# Patient Record
Sex: MALE | Race: WHITE | Employment: FULL TIME | URBAN - METROPOLITAN AREA
[De-identification: names, ages, dates, MRNs, and addresses within clinical notes are randomized per-mention and may not be internally consistent; named-entity substitution may affect disease eponyms.]

---

## 2017-03-26 DIAGNOSIS — F41.9 ANXIETY: ICD-10-CM

## 2017-03-27 RX ORDER — ALPRAZOLAM 0.5 MG/1
0.5 TABLET ORAL
Qty: 30 TAB | Refills: 1 | Status: SHIPPED | OUTPATIENT
Start: 2017-03-27 | End: 2017-11-12 | Stop reason: SDUPTHER

## 2017-03-27 NOTE — TELEPHONE ENCOUNTER
Requested Prescriptions     Pending Prescriptions Disp Refills    ALPRAZolam (XANAX) 0.5 mg tablet 30 Tab 1     Sig: Take 1 Tab by mouth daily as needed for Anxiety.      LOV 9/7/16  NOV NONE   Last refill 8/30/16

## 2017-03-27 NOTE — TELEPHONE ENCOUNTER
From: Denise Barnett  To: Lona Payne MD  Sent: 3/26/2017 9:14 PM EDT  Subject: Medication Renewal Request    Original authorizing provider: MD Denise Root would like a refill of the following medications:  ALPRAZolam Alverta Mckenna) 0.5 mg tablet Lona Payne MD]    Preferred pharmacy: [address] [place] [address]:

## 2017-11-12 DIAGNOSIS — F41.9 ANXIETY: ICD-10-CM

## 2017-11-13 RX ORDER — ALPRAZOLAM 0.5 MG/1
0.5 TABLET ORAL
Qty: 30 TAB | Refills: 0 | OUTPATIENT
Start: 2017-11-13 | End: 2018-02-01 | Stop reason: SDUPTHER

## 2017-11-13 NOTE — TELEPHONE ENCOUNTER
From: [facility]  To: Fanny Nance MD  Sent: 11/12/2017 7:40 AM EST  Subject: Medication Renewal Request    Original authorizing provider: MD Claudio Chaney would like a refill of the following medications:  ALPRAZolam Catrachita Sanes) 0.5 mg tablet Fanny Nance MD]    Preferred pharmacy: [address] [place] [address] Ct:

## 2017-11-13 NOTE — TELEPHONE ENCOUNTER
Last office visit- 3/26/17   Next office visit- No upcoming   Last Refill- 3/27/17    Requested Prescriptions     Pending Prescriptions Disp Refills    ALPRAZolam (XANAX) 0.5 mg tablet 30 Tab 1     Sig: Take 1 Tab by mouth daily as needed for Anxiety.

## 2018-02-01 DIAGNOSIS — F41.9 ANXIETY: ICD-10-CM

## 2018-02-01 RX ORDER — ALPRAZOLAM 0.5 MG/1
0.5 TABLET ORAL
Qty: 30 TAB | Refills: 0 | Status: SHIPPED | OUTPATIENT
Start: 2018-02-01 | End: 2018-04-24 | Stop reason: SDUPTHER

## 2018-02-01 NOTE — TELEPHONE ENCOUNTER
checked as delegate on 2/1/18  Last fill on ALPRAZOLAM 0.5 MG TABLET was 11/15/2017  # 30 for a 30 day supply

## 2018-02-01 NOTE — TELEPHONE ENCOUNTER
From: Cherri Rangel  To: Molly Correia MD  Sent: 2/1/2018 8:09 AM EST  Subject: Medication Renewal Request    Original authorizing provider: MD Cortez Mcneal would like a refill of the following medications:  ALPRAZolam (XANAX) 0.5 mg tablet Molly Correia MD]    Preferred pharmacy: [facility]/PHARMACY #[id number] [facility], [address] [facility] Ct:  I would like to request a refill

## 2018-04-24 DIAGNOSIS — F41.9 ANXIETY: ICD-10-CM

## 2018-04-25 DIAGNOSIS — Z00.00 ROUTINE GENERAL MEDICAL EXAMINATION AT A HEALTH CARE FACILITY: Primary | ICD-10-CM

## 2018-04-25 RX ORDER — ALPRAZOLAM 0.5 MG/1
0.5 TABLET ORAL
Qty: 30 TAB | Refills: 0 | Status: SHIPPED | OUTPATIENT
Start: 2018-04-25 | End: 2018-08-16 | Stop reason: SDUPTHER

## 2018-05-31 ENCOUNTER — OFFICE VISIT (OUTPATIENT)
Dept: INTERNAL MEDICINE CLINIC | Age: 53
End: 2018-05-31

## 2018-05-31 VITALS
SYSTOLIC BLOOD PRESSURE: 136 MMHG | HEART RATE: 68 BPM | HEIGHT: 72 IN | BODY MASS INDEX: 29.39 KG/M2 | RESPIRATION RATE: 16 BRPM | TEMPERATURE: 98.6 F | DIASTOLIC BLOOD PRESSURE: 90 MMHG | WEIGHT: 217 LBS | OXYGEN SATURATION: 98 %

## 2018-05-31 DIAGNOSIS — Z00.00 ROUTINE GENERAL MEDICAL EXAMINATION AT A HEALTH CARE FACILITY: Primary | ICD-10-CM

## 2018-05-31 DIAGNOSIS — R03.0 PREHYPERTENSION: ICD-10-CM

## 2018-05-31 DIAGNOSIS — E55.9 VITAMIN D DEFICIENCY: ICD-10-CM

## 2018-05-31 NOTE — PATIENT INSTRUCTIONS
Omron BP machine-     . DASH Diet: Care Instructions  Your Care Instructions    The DASH diet is an eating plan that can help lower your blood pressure. DASH stands for Dietary Approaches to Stop Hypertension. Hypertension is high blood pressure. The DASH diet focuses on eating foods that are high in calcium, potassium, and magnesium. These nutrients can lower blood pressure. The foods that are highest in these nutrients are fruits, vegetables, low-fat dairy products, nuts, seeds, and legumes. But taking calcium, potassium, and magnesium supplements instead of eating foods that are high in those nutrients does not have the same effect. The DASH diet also includes whole grains, fish, and poultry. The DASH diet is one of several lifestyle changes your doctor may recommend to lower your high blood pressure. Your doctor may also want you to decrease the amount of sodium in your diet. Lowering sodium while following the DASH diet can lower blood pressure even further than just the DASH diet alone. Follow-up care is a key part of your treatment and safety. Be sure to make and go to all appointments, and call your doctor if you are having problems. It's also a good idea to know your test results and keep a list of the medicines you take. How can you care for yourself at home? Following the DASH diet  · Eat 4 to 5 servings of fruit each day. A serving is 1 medium-sized piece of fruit, ½ cup chopped or canned fruit, 1/4 cup dried fruit, or 4 ounces (½ cup) of fruit juice. Choose fruit more often than fruit juice. · Eat 4 to 5 servings of vegetables each day. A serving is 1 cup of lettuce or raw leafy vegetables, ½ cup of chopped or cooked vegetables, or 4 ounces (½ cup) of vegetable juice. Choose vegetables more often than vegetable juice. · Get 2 to 3 servings of low-fat and fat-free dairy each day. A serving is 8 ounces of milk, 1 cup of yogurt, or 1 ½ ounces of cheese. · Eat 6 to 8 servings of grains each day. A serving is 1 slice of bread, 1 ounce of dry cereal, or ½ cup of cooked rice, pasta, or cooked cereal. Try to choose whole-grain products as much as possible. · Limit lean meat, poultry, and fish to 2 servings each day. A serving is 3 ounces, about the size of a deck of cards. · Eat 4 to 5 servings of nuts, seeds, and legumes (cooked dried beans, lentils, and split peas) each week. A serving is 1/3 cup of nuts, 2 tablespoons of seeds, or ½ cup of cooked beans or peas. · Limit fats and oils to 2 to 3 servings each day. A serving is 1 teaspoon of vegetable oil or 2 tablespoons of salad dressing. · Limit sweets and added sugars to 5 servings or less a week. A serving is 1 tablespoon jelly or jam, ½ cup sorbet, or 1 cup of lemonade. · Eat less than 2,300 milligrams (mg) of sodium a day. If you limit your sodium to 1,500 mg a day, you can lower your blood pressure even more. Tips for success  · Start small. Do not try to make dramatic changes to your diet all at once. You might feel that you are missing out on your favorite foods and then be more likely to not follow the plan. Make small changes, and stick with them. Once those changes become habit, add a few more changes. · Try some of the following:  ¨ Make it a goal to eat a fruit or vegetable at every meal and at snacks. This will make it easy to get the recommended amount of fruits and vegetables each day. ¨ Try yogurt topped with fruit and nuts for a snack or healthy dessert. ¨ Add lettuce, tomato, cucumber, and onion to sandwiches. ¨ Combine a ready-made pizza crust with low-fat mozzarella cheese and lots of vegetable toppings. Try using tomatoes, squash, spinach, broccoli, carrots, cauliflower, and onions. ¨ Have a variety of cut-up vegetables with a low-fat dip as an appetizer instead of chips and dip. ¨ Sprinkle sunflower seeds or chopped almonds over salads. Or try adding chopped walnuts or almonds to cooked vegetables.   ¨ Try some vegetarian meals using beans and peas. Add garbanzo or kidney beans to salads. Make burritos and tacos with mashed kaur beans or black beans. Where can you learn more? Go to http://socrates-alvin.info/. Enter V926 in the search box to learn more about \"DASH Diet: Care Instructions. \"  Current as of: September 21, 2016  Content Version: 11.4  © 0674-0973 [employer]. Care instructions adapted under license by [employer] (which disclaims liability or warranty for this information). If you have questions about a medical condition or this instruction, always ask your healthcare professional. [place] [address] any warranty or liability for your use of this information.

## 2018-05-31 NOTE — PROGRESS NOTES
HPI:  Jv Winslow is a 47y.o. year old male who is here for an annual physical:  LAST SEEN: Visit date not found    Wt Readings from Last 3 Encounters:   05/31/18 217 lb (98.4 kg)   09/07/16 213 lb (96.6 kg)   03/23/15 211 lb (95.7 kg)     Temp Readings from Last 3 Encounters:   05/31/18 98.6 °F (37 °C) (Oral)   09/07/16 98.1 °F (36.7 °C) (Oral)   03/23/15 98 °F (36.7 °C) (Oral)     BP Readings from Last 3 Encounters:   05/31/18 136/90   09/07/16 118/80   03/23/15 118/78     Pulse Readings from Last 3 Encounters:   05/31/18 68   09/07/16 62   03/23/15 61        He reports the following history and medical concerns:      Finished radiation for prostate cancer    PSA going down at 2.1      Uses xanax only 2-3 times a week. Found two ticks < 24         Assessment and Plan        1. Routine general medical examination at a health care facility  Not seen for 1.5 years. Doing well. Eating not as healthy. Chol is very good. Discussed that numbers on labs don't always reflect good health. Stress reduction techniques besides xanax    2. Prehypertension  Borderline diastolic. Get home machine and it should be less than 130/80. Send me readings on new machine. 3. Vitamin D deficiency  Start 1000 International units once a day. May help with prostate cancer. Sees dermatologist every 6 months. Xanax to use sparingly for panic attacks only.                  Historical Data    Past Medical History:   Diagnosis Date    Anxiety     takes xanax prn prescribed by Dr. Shelly Valdez [facility])     Prostate    Prehypertension 5/31/2018    Skin cancer     Squamous cell Carcinoma of upper lip s/p Moh's    Sun-damaged skin        Past Surgical History:   Procedure Laterality Date    HX APPENDECTOMY      HX CRYOABLATION OF THE PROSTATE      HX OTHER SURGICAL      Meckel's  diverticulum    HX VASECTOMY         Outpatient Encounter Prescriptions as of 5/31/2018   Medication Sig Dispense Refill    ALPRAZolam Zoë French) 0.5 mg tablet Take 1 Tab by mouth daily as needed for Anxiety. 30 Tab 0    aspirin delayed-release 81 mg tablet Take 1 Tab by mouth daily. 30 Tab 3     No facility-administered encounter medications on file as of 5/31/2018. No Known Allergies     Social History     Social History    Marital status:      Spouse name: N/A    Number of children: N/A    Years of education: N/A     Occupational History    [place]      Social History Main Topics    Smoking status: Current Some Day Smoker     Types: Cigars    Smokeless tobacco: Current User     Types: Snuff      Comment: once a month    Alcohol use 2.5 - 3.0 oz/week     5 - 6 Standard drinks or equivalent per week      Comment: 5-6 a week    Drug use: No    Sexual activity: Yes     Partners: Female     Other Topics Concern    Not on file     Social History Narrative        family history includes Cancer in his mother; Heart Disease in his father. Review of Systems   Constitutional: Negative for chills, diaphoresis, fever, malaise/fatigue and weight loss. HENT: Negative for hearing loss. Respiratory: Negative for cough. Cardiovascular: Negative for chest pain. Gastrointestinal: Negative for blood in stool and constipation. Genitourinary: Negative for dysuria, flank pain, frequency and urgency. Musculoskeletal: Negative for myalgias. Skin: Negative for rash. Neurological: Negative for dizziness, weakness and headaches. Endo/Heme/Allergies: Does not bruise/bleed easily. Visit Vitals    /90 (BP 1 Location: Left arm, BP Patient Position: Sitting)    Pulse 68    Temp 98.6 °F (37 °C) (Oral)    Resp 16    Ht 6' (1.829 m)    Wt 217 lb (98.4 kg)    SpO2 98%    BMI 29.43 kg/m2         Physical Exam   Constitutional: He is oriented to person, place, and time and well-developed, well-nourished, and in no distress. No distress.    HENT:   Nose: Nose normal.   Mouth/Throat: Oropharynx is clear and moist. No oropharyngeal exudate. Eyes: Conjunctivae and EOM are normal. Left eye exhibits no discharge. Neck: Normal range of motion. Neck supple. No thyromegaly present. Cardiovascular: Normal rate, regular rhythm and normal heart sounds. Exam reveals no friction rub. No murmur heard. Pulmonary/Chest: Effort normal and breath sounds normal. No respiratory distress. He has no wheezes. He has no rales. Abdominal: Soft. Bowel sounds are normal. He exhibits no distension. There is no tenderness. Musculoskeletal: Normal range of motion. He exhibits no edema, tenderness or deformity. Lymphadenopathy:     He has no cervical adenopathy. Neurological: He is alert and oriented to person, place, and time. He exhibits normal muscle tone. Coordination normal.   Skin: Skin is warm and dry. No rash noted. No erythema. Psychiatric: Mood and affect normal.     Ortho Exam     Assessment:  See Above for discussion/plan. Annual Physical completed. I have reviewed the patient's medical history in detail and updated the computerized patient record. We had a prolonged discussion about these complex clinical issues and went over the various important aspects to consider. All questions were answered. Advised him to call back or return to office if symptoms do not improve, change in nature, or persist.    He was given an after visit summary or informed of SendtoNews Access which includes patient instructions, diagnoses, current medications, & vitals. He expressed understanding with the diagnosis and plan.

## 2018-05-31 NOTE — MR AVS SNAPSHOT
[address] [facility], [address] [place] [address] 
[phone] Patient: Mikael Muir MRN:  [id number] Visit Information Date & Time Provider Department Dept. Phone Encounter #  
 2018  1:45 PM Guille Lynne MD [place] [id number] [id number] Follow-up Instructions Return in about 6 months (around 2018) for Follow up 15 minutes. Upcoming Health Maintenance Date Due Pneumococcal 19-64 Highest Risk (1 of 3 - PCV13) 3/25/1985 DTaP/Tdap/Td series (1 - Tdap) 3/22/2014 Influenza Age 5 to Adult 2018 COLONOSCOPY 3/10/2027 Allergies as of 2018  Review Complete On: 2018 By: Guille Lynne MD  
 No Known Allergies Current Immunizations  Reviewed on 3/23/2015 Name Date Td, Adsorbed PF 3/21/2014 10:43 AM  
  
 Not reviewed this visit Vitals BP Pulse Temp Resp Height(growth percentile) Weight(growth percentile) 136/90 (BP 1 Location: Left arm, BP Patient Position: Sitting) 68 98.6 °F (37 °C) (Oral) 16 6' (1.829 m) 217 lb (98.4 kg) SpO2 BMI Smoking Status 98% 29.43 kg/m2 Current Some Day Smoker Vitals History BMI and BSA Data Body Mass Index Body Surface Area  
 29.43 kg/m 2 2.24 m 2 Preferred Pharmacy Pharmacy Name Phone [facility]/PHARMACY #[id number]- [facility], [address] [phone] Your Updated Medication List  
  
   
This list is accurate as of 18  2:33 PM.  Always use your most recent med list.  
  
  
  
  
 ALPRAZolam 0.5 mg tablet Commonly known as:  Catalina Hopes Take 1 Tab by mouth daily as needed for Anxiety. aspirin delayed-release 81 mg tablet Take 1 Tab by mouth daily. Follow-up Instructions Return in about 6 months (around 2018) for Follow up 15 minutes. Patient Instructions Omron BP machine-  
 
 . 
  
DASH Diet: Care Instructions Your Care Instructions The DASH diet is an eating plan that can help lower your blood pressure. DASH stands for Dietary Approaches to Stop Hypertension. Hypertension is high blood pressure. The DASH diet focuses on eating foods that are high in calcium, potassium, and magnesium. These nutrients can lower blood pressure. The foods that are highest in these nutrients are fruits, vegetables, low-fat dairy products, nuts, seeds, and legumes. But taking calcium, potassium, and magnesium supplements instead of eating foods that are high in those nutrients does not have the same effect. The DASH diet also includes whole grains, fish, and poultry. The DASH diet is one of several lifestyle changes your doctor may recommend to lower your high blood pressure. Your doctor may also want you to decrease the amount of sodium in your diet. Lowering sodium while following the DASH diet can lower blood pressure even further than just the DASH diet alone. Follow-up care is a key part of your treatment and safety. Be sure to make and go to all appointments, and call your doctor if you are having problems. It's also a good idea to know your test results and keep a list of the medicines you take. How can you care for yourself at home? Following the DASH diet · Eat 4 to 5 servings of fruit each day. A serving is 1 medium-sized piece of fruit, ½ cup chopped or canned fruit, 1/4 cup dried fruit, or 4 ounces (½ cup) of fruit juice. Choose fruit more often than fruit juice. · Eat 4 to 5 servings of vegetables each day. A serving is 1 cup of lettuce or raw leafy vegetables, ½ cup of chopped or cooked vegetables, or 4 ounces (½ cup) of vegetable juice. Choose vegetables more often than vegetable juice. · Get 2 to 3 servings of low-fat and fat-free dairy each day. A serving is 8 ounces of milk, 1 cup of yogurt, or 1 ½ ounces of cheese. · Eat 6 to 8 servings of grains each day. A serving is 1 slice of bread, 1 ounce of dry cereal, or ½ cup of cooked rice, pasta, or cooked cereal. Try to choose whole-grain products as much as possible. · Limit lean meat, poultry, and fish to 2 servings each day. A serving is 3 ounces, about the size of a deck of cards. · Eat 4 to 5 servings of nuts, seeds, and legumes (cooked dried beans, lentils, and split peas) each week. A serving is 1/3 cup of nuts, 2 tablespoons of seeds, or ½ cup of cooked beans or peas. · Limit fats and oils to 2 to 3 servings each day. A serving is 1 teaspoon of vegetable oil or 2 tablespoons of salad dressing. · Limit sweets and added sugars to 5 servings or less a week. A serving is 1 tablespoon jelly or jam, ½ cup sorbet, or 1 cup of lemonade. · Eat less than 2,300 milligrams (mg) of sodium a day. If you limit your sodium to 1,500 mg a day, you can lower your blood pressure even more. Tips for success · Start small. Do not try to make dramatic changes to your diet all at once. You might feel that you are missing out on your favorite foods and then be more likely to not follow the plan. Make small changes, and stick with them. Once those changes become habit, add a few more changes. · Try some of the following: ¨ Make it a goal to eat a fruit or vegetable at every meal and at snacks. This will make it easy to get the recommended amount of fruits and vegetables each day. ¨ Try yogurt topped with fruit and nuts for a snack or healthy dessert. ¨ Add lettuce, tomato, cucumber, and onion to sandwiches. ¨ Combine a ready-made pizza crust with low-fat mozzarella cheese and lots of vegetable toppings. Try using tomatoes, squash, spinach, broccoli, carrots, cauliflower, and onions. ¨ Have a variety of cut-up vegetables with a low-fat dip as an appetizer instead of chips and dip. ¨ Sprinkle sunflower seeds or chopped almonds over salads.  Or try adding chopped walnuts or almonds to cooked vegetables. ¨ Try some vegetarian meals using beans and peas. Add garbanzo or kidney beans to salads. Make burritos and tacos with mashed kaur beans or black beans. Where can you learn more? Go to http://socrates-alvin.info/. Enter F927 in the search box to learn more about \"DASH Diet: Care Instructions. \" Current as of: September 21, 2016 Content Version: 11.4 © 8656-9965 appAttach. Care instructions adapted under license by [employer] (which disclaims liability or warranty for this information). If you have questions about a medical condition or this instruction, always ask your healthcare professional. [address] any warranty or liability for your use of this information. Introducing \[facility] [place] [facility]\"" & HEALTH SERVICES! Dear Maricruz Pepe: Thank you for requesting a Areshay account. Our records indicate that you already have an active Areshay account. You can access your account anytime at [email or web address] Did you know that you can access your hospital and ER discharge instructions at any time in Areshay? You can also review all of your test results from your hospital stay or ER visit. Additional Information If you have questions, please visit the Frequently Asked Questions section of the Areshay website at [email or web address] Remember, Areshay is NOT to be used for urgent needs. For medical emergencies, dial 911. Now available from your iPhone and Android! Please provide this summary of care documentation to your next provider. Your primary care clinician is listed as Tara Syed. If you have any questions after today's visit, please call [phone].

## 2018-08-16 DIAGNOSIS — F41.9 ANXIETY: ICD-10-CM

## 2018-08-17 NOTE — TELEPHONE ENCOUNTER
From: Alberto Link  To: Milton Treviño MD  Sent: 8/16/2018 11:35 AM EDT  Subject: Medication Renewal Request    Original authorizing provider: MD Anastacia Ames  Yanira would like a refill of the following medications:  ALPRAZolam Richard Re) 0.5 mg tablet Milton Treviño MD]    Preferred pharmacy: [address] [place] [address] Ct:

## 2018-08-18 DIAGNOSIS — F41.9 ANXIETY: ICD-10-CM

## 2018-08-20 RX ORDER — ALPRAZOLAM 0.5 MG/1
0.5 TABLET ORAL
Qty: 30 TAB | Refills: 0 | Status: SHIPPED | OUTPATIENT
Start: 2018-08-20 | End: 2018-08-23 | Stop reason: SDUPTHER

## 2018-08-23 RX ORDER — ALPRAZOLAM 0.5 MG/1
0.5 TABLET ORAL
Qty: 30 TAB | Refills: 0 | Status: SHIPPED | OUTPATIENT
Start: 2018-08-23 | End: 2018-10-29 | Stop reason: SDUPTHER

## 2018-08-23 NOTE — TELEPHONE ENCOUNTER
From: Haley Strong  To: Evangelina Kamara MD  Sent: 8/18/2018 8:52 AM EDT  Subject: Medication Renewal Request    Original authorizing provider: MD Madison Howell would like a refill of the following medications:  ALPRAZolam Ed Jetty) 0.5 mg tablet Evangelina Kamara MD]    Preferred pharmacy: [address] [place] [address] Ct:

## 2018-10-29 DIAGNOSIS — F41.9 ANXIETY: ICD-10-CM

## 2018-10-31 RX ORDER — ALPRAZOLAM 0.5 MG/1
0.5 TABLET ORAL
Qty: 30 TAB | Refills: 0 | Status: SHIPPED | OUTPATIENT
Start: 2018-10-31 | End: 2019-02-20 | Stop reason: SDUPTHER

## 2018-11-27 ENCOUNTER — OFFICE VISIT (OUTPATIENT)
Dept: INTERNAL MEDICINE CLINIC | Age: 53
End: 2018-11-27

## 2018-11-27 VITALS
HEIGHT: 72 IN | DIASTOLIC BLOOD PRESSURE: 84 MMHG | BODY MASS INDEX: 30.26 KG/M2 | HEART RATE: 72 BPM | WEIGHT: 223.4 LBS | TEMPERATURE: 98.2 F | RESPIRATION RATE: 18 BRPM | SYSTOLIC BLOOD PRESSURE: 132 MMHG | OXYGEN SATURATION: 97 %

## 2018-11-27 DIAGNOSIS — R03.0 PREHYPERTENSION: Primary | ICD-10-CM

## 2018-11-27 RX ORDER — SILDENAFIL CITRATE 20 MG/1
TABLET ORAL
Refills: 5 | COMMUNITY
Start: 2018-11-16

## 2018-11-27 NOTE — PROGRESS NOTES
Hypertension (6 mth f/u) HPI: 
Fercho Rowe is a 47y.o. year old male who is here for a follow up visit. He was last seen by me on 5/31/2018. He reports the following: 
 
Radiation seeds  PSA 0.9- doing well. Needs cialis. BP at home 137/81  132/90 No chest pain or SOB or HA Assessment and Plan 1. Borderline hypertension- 
Continue to observe. Home readings are ok but DBP still a little high. Work on stress reduction. Breathing exercises. DASH diet. Visit Vitals /84 Pulse 72 Temp 98.2 °F (36.8 °C) (Oral) Resp 18 Ht 6' (1.829 m) Wt 223 lb 6.4 oz (101.3 kg) SpO2 97% BMI 30.30 kg/m² Historical Data Past Medical History:  
Diagnosis Date  Anxiety   
 takes xanax prn prescribed by Dr. Emily Mills  Cancer ([facility] [address] Prostate  Prehypertension 5/31/2018  Skin cancer Squamous cell Carcinoma of upper lip s/p Moh's  Sun-damaged skin Past Surgical History:  
Procedure Laterality Date  HX APPENDECTOMY  HX CRYOABLATION OF THE PROSTATE    
 HX OTHER SURGICAL Meckel's  diverticulum  HX VASECTOMY Outpatient Encounter Medications as of 11/27/2018 Medication Sig Dispense Refill  sildenafil, antihypertensive, (REVATIO) 20 mg tablet TAKE ONE TO FIVE TABLETS BY MOUTH AS NEEDED ONE HOUR PRIOR TO SEXUAL ACTIVITY  5  
 ALPRAZolam (XANAX) 0.5 mg tablet Take 1 Tab by mouth daily as needed for Anxiety. 30 Tab 0  
 aspirin delayed-release 81 mg tablet Take 1 Tab by mouth daily. 30 Tab 3 No facility-administered encounter medications on file as of 11/27/2018. No Known Allergies Social History Socioeconomic History  Marital status:  Spouse name: Not on file  Number of children: Not on file  Years of education: Not on file  Highest education level: Not on file Social Needs  Financial resource strain: Not on file  Food insecurity - worry: Not on file  Food insecurity - inability: Not on file  Transportation needs - medical: Not on file  Transportation needs - non-medical: Not on file Occupational History  Occupation: [employer] Tobacco Use  Smoking status: Current Some Day Smoker Types: Cigars  Smokeless tobacco: Current User Types: Snuff  Tobacco comment: once a month Substance and Sexual Activity  Alcohol use: Yes Alcohol/week: 2.5 - 3.0 oz Types: 5 - 6 Standard drinks or equivalent per week Comment: 5-6 a week  Drug use: No  
 Sexual activity: Yes  
  Partners: Female Other Topics Concern  Not on file Social History Narrative  Not on file  
  
 
family history includes Cancer in his mother; Heart Disease in his father. Review of Systems Constitutional: Negative for weight loss. Eyes: Negative for blurred vision. Respiratory: Negative for shortness of breath. Cardiovascular: Negative for chest pain. Gastrointestinal: Negative for abdominal pain. Genitourinary: Negative for dysuria and frequency. Skin: Negative for rash. Neurological: Negative for dizziness, focal weakness, weakness and headaches. Endo/Heme/Allergies: Negative for environmental allergies. Does not bruise/bleed easily. Physical Exam  
Constitutional: He appears well-developed and well-nourished. He is active. Non-toxic appearance. He does not have a sickly appearance. He does not appear ill. No distress. Eyes: Conjunctivae are normal. Right eye exhibits no discharge. Neck: Carotid bruit is not present. No thyroid mass and no thyromegaly present. Cardiovascular: Normal rate, regular rhythm, S1 normal, S2 normal, normal heart sounds and normal pulses. Exam reveals no gallop and no friction rub. Pulmonary/Chest: Effort normal and breath sounds normal. No respiratory distress. Abdominal: Soft. Bowel sounds are normal.  
Musculoskeletal: He exhibits no edema or deformity. Neurological: He is alert. Coordination normal.  
Skin: Skin is warm and dry. No rash noted. No pallor. Psychiatric: He has a normal mood and affect. His behavior is normal.  
Vitals reviewed. Ortho Exam 
 
 
Orders Placed This Encounter  sildenafil, antihypertensive, (REVATIO) 20 mg tablet Sig: TAKE ONE TO FIVE TABLETS BY MOUTH AS NEEDED ONE HOUR PRIOR TO SEXUAL ACTIVITY Refill:  5 I have reviewed the patient's medical history in detail and updated the computerized patient record. We had a prolonged discussion about these complex clinical issues and went over the various important aspects to consider. All questions were answered. Advised him to call back or return to office if symptoms do not improve, change in nature, or persist. 
 
He was given an after visit summary or informed of [employer] which includes patient instructions, diagnoses, current medications, & vitals. He expressed understanding with the diagnosis and plan.

## 2018-11-27 NOTE — PATIENT INSTRUCTIONS
DASH Diet: Care Instructions Your Care Instructions The DASH diet is an eating plan that can help lower your blood pressure. DASH stands for Dietary Approaches to Stop Hypertension. Hypertension is high blood pressure. The DASH diet focuses on eating foods that are high in calcium, potassium, and magnesium. These nutrients can lower blood pressure. The foods that are highest in these nutrients are fruits, vegetables, low-fat dairy products, nuts, seeds, and legumes. But taking calcium, potassium, and magnesium supplements instead of eating foods that are high in those nutrients does not have the same effect. The DASH diet also includes whole grains, fish, and poultry. The DASH diet is one of several lifestyle changes your doctor may recommend to lower your high blood pressure. Your doctor may also want you to decrease the amount of sodium in your diet. Lowering sodium while following the DASH diet can lower blood pressure even further than just the DASH diet alone. Follow-up care is a key part of your treatment and safety. Be sure to make and go to all appointments, and call your doctor if you are having problems. It's also a good idea to know your test results and keep a list of the medicines you take. How can you care for yourself at home? Following the DASH diet · Eat 4 to 5 servings of fruit each day. A serving is 1 medium-sized piece of fruit, ½ cup chopped or canned fruit, 1/4 cup dried fruit, or 4 ounces (½ cup) of fruit juice. Choose fruit more often than fruit juice. · Eat 4 to 5 servings of vegetables each day. A serving is 1 cup of lettuce or raw leafy vegetables, ½ cup of chopped or cooked vegetables, or 4 ounces (½ cup) of vegetable juice. Choose vegetables more often than vegetable juice. · Get 2 to 3 servings of low-fat and fat-free dairy each day. A serving is 8 ounces of milk, 1 cup of yogurt, or 1 ½ ounces of cheese. · Eat 6 to 8 servings of grains each day. A serving is 1 slice of bread, 1 ounce of dry cereal, or ½ cup of cooked rice, pasta, or cooked cereal. Try to choose whole-grain products as much as possible. · Limit lean meat, poultry, and fish to 2 servings each day. A serving is 3 ounces, about the size of a deck of cards. · Eat 4 to 5 servings of nuts, seeds, and legumes (cooked dried beans, lentils, and split peas) each week. A serving is 1/3 cup of nuts, 2 tablespoons of seeds, or ½ cup of cooked beans or peas. · Limit fats and oils to 2 to 3 servings each day. A serving is 1 teaspoon of vegetable oil or 2 tablespoons of salad dressing. · Limit sweets and added sugars to 5 servings or less a week. A serving is 1 tablespoon jelly or jam, ½ cup sorbet, or 1 cup of lemonade. · Eat less than 2,300 milligrams (mg) of sodium a day. If you limit your sodium to 1,500 mg a day, you can lower your blood pressure even more. Tips for success · Start small. Do not try to make dramatic changes to your diet all at once. You might feel that you are missing out on your favorite foods and then be more likely to not follow the plan. Make small changes, and stick with them. Once those changes become habit, add a few more changes. · Try some of the following: ? Make it a goal to eat a fruit or vegetable at every meal and at snacks. This will make it easy to get the recommended amount of fruits and vegetables each day. ? Try yogurt topped with fruit and nuts for a snack or healthy dessert. ? Add lettuce, tomato, cucumber, and onion to sandwiches. ? Combine a ready-made pizza crust with low-fat mozzarella cheese and lots of vegetable toppings. Try using tomatoes, squash, spinach, broccoli, carrots, cauliflower, and onions. ? Have a variety of cut-up vegetables with a low-fat dip as an appetizer instead of chips and dip. ? Sprinkle sunflower seeds or chopped almonds over salads.  Or try adding chopped walnuts or almonds to cooked vegetables. ? Try some vegetarian meals using beans and peas. Add garbanzo or kidney beans to salads. Make burritos and tacos with mashed kaur beans or black beans. Where can you learn more? Go to http://socrates-alvin.info/. Enter F934 in the search box to learn more about \"DASH Diet: Care Instructions. \" Current as of: December 6, 2017 Content Version: 11.8 © 4631-0309 [employer]. Care instructions adapted under license by [employer] (which disclaims liability or warranty for this information). If you have questions about a medical condition or this instruction, always ask your healthcare professional. [address] any warranty or liability for your use of this information. Exercise 1: 
The 4-7-8 (or Relaxing Breath) Exercise This exercise is utterly simple, takes almost no time, requires no equipment and can be done anywhere. Although you can do the exercise in any position, sit with your back straight while learning the exercise. Place the tip of your tongue against the ridge of tissue just behind your upper front teeth, and keep it there through the entire exercise. You will be exhaling through your mouth around your tongue; try pursing your lips slightly if this seems awkward. ? Exhale completely through your mouth, making a whoosh sound. ? Close your mouth and inhale quietly through your nose to a mental count of four. ? Hold your breath for a count of seven. ? Exhale completely through your mouth, making a whoosh sound to a count of eight. ? This is one breath. Now inhale again and repeat the cycle three more times for a total of four breaths. Note that you always inhale quietly through your nose and exhale audibly through your mouth. The tip of your tongue stays in position the whole time. Exhalation takes twice as long as inhalation.  The absolute time you spend on each phase is not important; the ratio of 4:7:8 is important. If you have trouble holding your breath, speed the exercise up but keep to the ratio of 4:7:8 for the three phases. With practice you can slow it all down and get used to inhaling and exhaling more and more deeply. This exercise is a natural tranquilizer for the nervous system. Unlike tranquilizing drugs, which are often effective when you first take them but then lose their power over time, this exercise is subtle when you first try it but gains in power with repetition and practice. Do it at least twice a day. You cannot do it too frequently. Do not do more than four breaths at one time for the first month of practice. Later, if you wish, you can extend it to eight breaths. If you feel a little lightheaded when you first breathe this way, do not be concerned; it will pass. Once you develop this technique by practicing it every day, it will be a very useful tool that you will always have with you. Use it whenever anything upsetting happens - before you react. Use it whenever you are aware of internal tension. Use it to help you fall asleep. This exercise cannot be recommended too highly. Everyone can benefit from it. Taken from Viral Burnette MD [facility] of [address] Search Joya Burnette breathing exercises on [employer] and you will see a video

## 2018-11-27 NOTE — PROGRESS NOTES
Reviewed record in preparation for visit and have obtained necessary documentation. Identified pt with two pt identifiers(name and ). Health Maintenance Due Topic  Pneumococcal 19-64 Highest Risk (1 of 3 - PCV13)  DTaP/Tdap/Td series (1 - Tdap)  Shingrix Vaccine Age 50> (1 of 2)  Influenza Age 5 to Adult Chief Complaint Patient presents with  Hypertension 6 mth f/u Wt Readings from Last 3 Encounters:  
18 223 lb 6.4 oz (101.3 kg) 18 217 lb (98.4 kg) 16 213 lb (96.6 kg) Temp Readings from Last 3 Encounters:  
18 98.6 °F (37 °C) (Oral) 16 98.1 °F (36.7 °C) (Oral) 03/23/15 98 °F (36.7 °C) (Oral) BP Readings from Last 3 Encounters:  
18 136/90  
16 118/80  
03/23/15 118/78 Pulse Readings from Last 3 Encounters:  
18 68  
16 62  
03/23/15 61 Learning Assessment: 
:  
 
Learning Assessment 2018 2015 3/21/2014 PRIMARY LEARNER Patient Patient Patient HIGHEST LEVEL OF EDUCATION - PRIMARY LEARNER  4 YEARS OF COLLEGE 4 YEARS OF COLLEGE 4 YEARS OF COLLEGE  
BARRIERS PRIMARY LEARNER NONE NONE NONE  
CO-LEARNER CAREGIVER No No No  
PRIMARY LANGUAGE ENGLISH ENGLISH ENGLISH  NEED - No No  
LEARNER PREFERENCE PRIMARY READING READING VIDEOS  
LEARNING SPECIAL TOPICS - no no ANSWERED BY patient  patient patient RELATIONSHIP SELF SELF SELF  
ASSESSMENT COMMENT - none none Depression Screening: 
:  
 
PHQ over the last two weeks 2018 Little interest or pleasure in doing things Not at all Feeling down, depressed, irritable, or hopeless Not at all Total Score PHQ 2 0 Fall Risk Assessment: 
:  
 
Fall Risk Assessment, last 12 mths 2018 Able to walk? Yes Fall in past 12 months? No  
 
 
Abuse Screening: 
:  
 
Abuse Screening Questionnaire 2018 2015 3/21/2014 Do you ever feel afraid of your partner?  N N N  
 Are you in a relationship with someone who physically or mentally threatens you? N N N Is it safe for you to go home? Gaurav Zafar Coordination of Care Questionnaire: 
:  
 
1) Have you been to an emergency room, urgent care clinic since your last visit? no  
Hospitalized since your last visit? no          
 
2) Have you seen or consulted any other health care providers outside of [address] [place] [address] since your last visit? no  (Include any pap smears or colon screenings in this section.) 3) Do you have an Advance Directive on file? no 
 
4) Are you interested in receiving information on Advance Directives? NO Patient is accompanied by self I have received verbal consent from Theador Better to discuss any/all medical information while they are present in the room. Visit Vitals BP (!) 142/100 (BP 1 Location: Right arm, BP Patient Position: Sitting) Pulse 72 Temp 98.2 °F (36.8 °C) (Oral) Resp 18 Ht 6' (1.829 m) Wt 223 lb 6.4 oz (101.3 kg) SpO2 97% BMI 30.30 kg/m²

## 2019-02-16 DIAGNOSIS — F41.9 ANXIETY: ICD-10-CM

## 2019-02-18 NOTE — TELEPHONE ENCOUNTER
Last refill- 10.31.18  Last office visit - 11/27/2018  (3 month f/u recommended)  Next office visit - No future appointments. Requested Prescriptions     Pending Prescriptions Disp Refills    ALPRAZolam (XANAX) 0.5 mg tablet 30 Tab 0     Sig: Take 1 Tab by mouth daily as needed for Anxiety.

## 2019-02-20 DIAGNOSIS — F41.9 ANXIETY: ICD-10-CM

## 2019-02-20 RX ORDER — ALPRAZOLAM 0.5 MG/1
0.5 TABLET ORAL
Qty: 30 TAB | Refills: 0 | Status: SHIPPED | OUTPATIENT
Start: 2019-02-20 | End: 2019-05-28 | Stop reason: SDUPTHER

## 2019-02-20 NOTE — TELEPHONE ENCOUNTER
checked as delegate on 2/20/19  Last fill on xanax was 10/31/2018   # 30 for a 30 day supply     Last office visit 11/27/18  No upcoming appointments on file       Called patient to advise that the refill request is being sent to a provider for approval however he needs to be actively looking for a new pcp as Dr Jeirca Zamarripa will no longer be with the practice after next month     Left voicemail message with the above mentioned information

## 2019-03-07 RX ORDER — ALPRAZOLAM 0.5 MG/1
0.5 TABLET ORAL
Qty: 30 TAB | Refills: 0 | OUTPATIENT
Start: 2019-03-07

## 2019-03-12 ENCOUNTER — OFFICE VISIT (OUTPATIENT)
Dept: INTERNAL MEDICINE CLINIC | Age: 54
End: 2019-03-12

## 2019-03-12 VITALS
SYSTOLIC BLOOD PRESSURE: 120 MMHG | BODY MASS INDEX: 29.85 KG/M2 | HEIGHT: 72 IN | RESPIRATION RATE: 16 BRPM | TEMPERATURE: 98.2 F | HEART RATE: 60 BPM | DIASTOLIC BLOOD PRESSURE: 82 MMHG | OXYGEN SATURATION: 97 % | WEIGHT: 220.4 LBS

## 2019-03-12 DIAGNOSIS — R21 RASH: Primary | ICD-10-CM

## 2019-03-12 NOTE — PROGRESS NOTES
Joanie Nguyen is a 47 y.o. male    Chief Complaint   Patient presents with    Blood Pressure Check     f/u    Rash     x 1 week. On his back, since returning from trip to [place]. 1. Have you been to the ER, urgent care clinic since your last visit? Hospitalized since your last visit? No    2. Have you seen or consulted any other health care providers outside of the [address] [place] [address] since your last visit? Include any pap smears or colon screening. No     Visit Vitals  /78 (BP 1 Location: Left arm, BP Patient Position: Sitting)   Pulse 60   Temp 98.2 °F (36.8 °C) (Oral)   Resp 16   Ht 6' (1.829 m)   Wt 220 lb 6.4 oz (100 kg)   SpO2 97%   BMI 29.89 kg/m²     .   Health Maintenance Due   Topic Date Due    Pneumococcal 19-64 Medium Risk (1 of 1 - PPSV23) 03/25/1985    DTaP/Tdap/Td series (1 - Tdap) 03/22/2014    Shingrix Vaccine Age 50> (1 of 2) 03/25/2016       Valeriano Rowe LPN

## 2019-03-12 NOTE — PROGRESS NOTES
HISTORY OF PRESENT ILLNESS  Darrick Cavazos is a 47 y.o. male. Patient reports rash x 1 week since recent trips to [place] and ski resort. Rash is dry and itchy. He was in chlorinated pool and ocean. He reports using sunscreen daily. He also reports being in the sun daily. His wife had a similar rash, but not as severe and hers improved. Does not report sore throat. Patient also here due to elevated blood pressure readings in the past. No dizziness, chest pain, or headaches. Visit Vitals  /82 (BP 1 Location: Right arm, BP Patient Position: Sitting)   Pulse 60   Temp 98.2 °F (36.8 °C) (Oral)   Resp 16   Ht 6' (1.829 m)   Wt 220 lb 6.4 oz (100 kg)   SpO2 97%   BMI 29.89 kg/m²       HPI    Review of Systems   Skin: Positive for rash. Neurological: Negative for dizziness and headaches. Physical Exam   Constitutional: He is oriented to person, place, and time. He appears well-developed and well-nourished. Neurological: He is alert and oriented to person, place, and time. Skin:   Dry sandpaper rash with pinpoint erythematous bumps, no surrounding erythema; rash scattered on trunk and arms; +itching noted   Psychiatric: He has a normal mood and affect. ASSESSMENT and PLAN    ICD-10-CM ICD-9-CM    1. Rash R21 782.1      No orders of the defined types were placed in this encounter.   hydrocortisone and aquaphor  May take zyrtec for itching  Follow-up if no improvement in 1 week  Reassurance provided for blood pressure readings, but may continue to monitor at home

## 2019-05-28 ENCOUNTER — PATIENT MESSAGE (OUTPATIENT)
Dept: INTERNAL MEDICINE CLINIC | Age: 54
End: 2019-05-28

## 2019-05-28 DIAGNOSIS — F41.9 ANXIETY: ICD-10-CM

## 2019-05-28 RX ORDER — ALPRAZOLAM 0.5 MG/1
0.5 TABLET ORAL
Qty: 30 TAB | Refills: 0 | Status: SHIPPED | OUTPATIENT
Start: 2019-05-28 | End: 2019-08-14 | Stop reason: SDUPTHER

## 2019-05-28 NOTE — TELEPHONE ENCOUNTER
Last refill- 2/20/19  Last office visit - 5/31/18  Next office visit -   Future Appointments   Date Time Provider Jann Davis   6/25/2019  3:00 PM Alyssia Gamino MD [address] [place] [address]         Requested Prescriptions     Pending Prescriptions Disp Refills    ALPRAZolam (XANAX) 0.5 mg tablet 30 Tab 0     Sig: Take 1 Tab by mouth daily as needed for Anxiety.

## 2019-05-28 NOTE — TELEPHONE ENCOUNTER
From: Adri Gilman  Sent: 5/28/2019 9:25 AM EDT  Subject: Prescription Question    Can I get a refill on Alprozolam?

## 2019-05-29 NOTE — TELEPHONE ENCOUNTER
Phoned in script to the [email or web address] voicemail on file. Requested Prescriptions     Signed Prescriptions Disp Refills    ALPRAZolam (XANAX) 0.5 mg tablet 30 Tab 0     Sig: Take 1 Tab by mouth daily as needed for Anxiety.      Authorizing Provider: Jailyn Anderson

## 2019-06-26 ENCOUNTER — OFFICE VISIT (OUTPATIENT)
Dept: PRIMARY CARE CLINIC | Age: 54
End: 2019-06-26

## 2019-06-26 VITALS
HEART RATE: 48 BPM | WEIGHT: 225 LBS | DIASTOLIC BLOOD PRESSURE: 80 MMHG | HEIGHT: 72 IN | OXYGEN SATURATION: 98 % | SYSTOLIC BLOOD PRESSURE: 128 MMHG | TEMPERATURE: 97.7 F | RESPIRATION RATE: 16 BRPM | BODY MASS INDEX: 30.48 KG/M2

## 2019-06-26 DIAGNOSIS — R00.1 BRADYCARDIA: ICD-10-CM

## 2019-06-26 DIAGNOSIS — Z85.46 HISTORY OF PROSTATE CANCER: ICD-10-CM

## 2019-06-26 DIAGNOSIS — Z00.00 PHYSICAL EXAM: Primary | ICD-10-CM

## 2019-06-26 DIAGNOSIS — E66.9 OBESITY (BMI 30.0-34.9): ICD-10-CM

## 2019-06-26 DIAGNOSIS — E55.9 VITAMIN D DEFICIENCY: ICD-10-CM

## 2019-06-26 DIAGNOSIS — R61 SWEATING INCREASE: ICD-10-CM

## 2019-06-26 PROBLEM — R03.0 PREHYPERTENSION: Status: RESOLVED | Noted: 2018-05-31 | Resolved: 2019-06-26

## 2019-06-26 NOTE — PROGRESS NOTES
Margotangelsaeid Pascual is a 49 y.o. male    Chief Complaint   Patient presents with    New Patient     Pt is here to establish care.             Health Maintenance Due   Topic Date Due    Pneumococcal 0-64 years (1 of 1 - PPSV23) 03/25/1972    DTaP/Tdap/Td series (1 - Tdap) 03/22/2014    Shingrix Vaccine Age 50> (1 of 2) 03/25/2016     Visit Vitals  /70 (BP 1 Location: Left arm, BP Patient Position: Sitting)   Pulse (!) 48   Temp 97.7 °F (36.5 °C) (Oral)   Resp 16   Ht 6' (1.829 m)   Wt 225 lb (102.1 kg)   SpO2 98%   BMI 30.52 kg/m²

## 2019-06-27 LAB
25(OH)D3+25(OH)D2 SERPL-MCNC: 22.7 NG/ML (ref 30–100)
ALBUMIN SERPL-MCNC: 4.1 G/DL (ref 3.5–5.5)
ALBUMIN/GLOB SERPL: 1.8 {RATIO} (ref 1.2–2.2)
ALP SERPL-CCNC: 62 IU/L (ref 39–117)
ALT SERPL-CCNC: 28 IU/L (ref 0–44)
AST SERPL-CCNC: 20 IU/L (ref 0–40)
BILIRUB SERPL-MCNC: 0.3 MG/DL (ref 0–1.2)
BUN SERPL-MCNC: 12 MG/DL (ref 6–24)
BUN/CREAT SERPL: 13 (ref 9–20)
CALCIUM SERPL-MCNC: 8.9 MG/DL (ref 8.7–10.2)
CHLORIDE SERPL-SCNC: 108 MMOL/L (ref 96–106)
CHOLEST SERPL-MCNC: 181 MG/DL (ref 100–199)
CO2 SERPL-SCNC: 21 MMOL/L (ref 20–29)
CREAT SERPL-MCNC: 0.93 MG/DL (ref 0.76–1.27)
ERYTHROCYTE [DISTWIDTH] IN BLOOD BY AUTOMATED COUNT: 14 % (ref 12.3–15.4)
GLOBULIN SER CALC-MCNC: 2.3 G/DL (ref 1.5–4.5)
GLUCOSE SERPL-MCNC: 99 MG/DL (ref 65–99)
HCT VFR BLD AUTO: 44.5 % (ref 37.5–51)
HDLC SERPL-MCNC: 69 MG/DL
HGB BLD-MCNC: 14.2 G/DL (ref 13–17.7)
LDLC SERPL CALC-MCNC: 92 MG/DL (ref 0–99)
MCH RBC QN AUTO: 29.5 PG (ref 26.6–33)
MCHC RBC AUTO-ENTMCNC: 31.9 G/DL (ref 31.5–35.7)
MCV RBC AUTO: 92 FL (ref 79–97)
PLATELET # BLD AUTO: 218 X10E3/UL (ref 150–450)
POTASSIUM SERPL-SCNC: 4.6 MMOL/L (ref 3.5–5.2)
PROT SERPL-MCNC: 6.4 G/DL (ref 6–8.5)
RBC # BLD AUTO: 4.82 X10E6/UL (ref 4.14–5.8)
SODIUM SERPL-SCNC: 142 MMOL/L (ref 134–144)
TRIGL SERPL-MCNC: 102 MG/DL (ref 0–149)
TSH SERPL DL<=0.005 MIU/L-ACNC: 2.66 UIU/ML (ref 0.45–4.5)
VLDLC SERPL CALC-MCNC: 20 MG/DL (ref 5–40)
WBC # BLD AUTO: 5.4 X10E3/UL (ref 3.4–10.8)

## 2019-06-29 NOTE — PROGRESS NOTES
Ld Jj, your blood report came back fine except Vitamin D came low. Please start taking over the counter Vitamin D 3 1000 I.u daily.

## 2019-07-09 ENCOUNTER — TELEPHONE (OUTPATIENT)
Dept: PRIMARY CARE CLINIC | Age: 54
End: 2019-07-09

## 2019-07-09 NOTE — TELEPHONE ENCOUNTER
Pt stated he was supposed to get a heart monitor and lost the number and has no idea who to contact. I do not see anything in the chart.  Would like a call back with information

## 2019-07-15 ENCOUNTER — HOSPITAL ENCOUNTER (OUTPATIENT)
Dept: NON INVASIVE DIAGNOSTICS | Age: 54
Discharge: HOME | End: 2019-07-15
Attending: INTERNAL MEDICINE
Payer: COMMERCIAL

## 2019-07-15 DIAGNOSIS — R00.1 BRADYCARDIA: ICD-10-CM

## 2019-07-15 PROCEDURE — 93225 XTRNL ECG REC<48 HRS REC: CPT

## 2019-08-14 ENCOUNTER — PATIENT MESSAGE (OUTPATIENT)
Dept: PRIMARY CARE CLINIC | Age: 54
End: 2019-08-14

## 2019-08-14 ENCOUNTER — TELEPHONE (OUTPATIENT)
Dept: PRIMARY CARE CLINIC | Age: 54
End: 2019-08-14

## 2019-08-14 DIAGNOSIS — F41.9 ANXIETY: ICD-10-CM

## 2019-08-14 RX ORDER — ALPRAZOLAM 0.5 MG/1
0.5 TABLET ORAL
Qty: 30 TAB | Refills: 0 | Status: SHIPPED | OUTPATIENT
Start: 2019-08-14 | End: 2019-11-06 | Stop reason: SDUPTHER

## 2019-08-14 NOTE — TELEPHONE ENCOUNTER
From: Simone Larsen  To: Damion Sandhu MD  Sent: 8/14/2019 10:38 AM EDT  Subject: Prescription Question    Hello    I would like to request a refill prescription for the Alorazolam I have been taking for some time. Can you send in a refill request to my pharmacy?     Thanks  Kayla

## 2019-10-18 ENCOUNTER — OFFICE VISIT (OUTPATIENT)
Dept: CARDIOLOGY CLINIC | Age: 54
End: 2019-10-18

## 2019-10-18 VITALS
DIASTOLIC BLOOD PRESSURE: 86 MMHG | HEART RATE: 66 BPM | OXYGEN SATURATION: 95 % | WEIGHT: 220 LBS | RESPIRATION RATE: 16 BRPM | SYSTOLIC BLOOD PRESSURE: 124 MMHG | BODY MASS INDEX: 29.8 KG/M2 | HEIGHT: 72 IN

## 2019-10-18 DIAGNOSIS — I45.10 RBBB: ICD-10-CM

## 2019-10-18 DIAGNOSIS — Z85.46 HISTORY OF PROSTATE CANCER: ICD-10-CM

## 2019-10-18 DIAGNOSIS — R00.1 SINUS BRADYCARDIA: Primary | ICD-10-CM

## 2019-10-18 NOTE — LETTER
10/18/19 Patient: Leni Caldera YOB: 1965 Date of Visit: 10/18/2019 Mary Westfall MD 
[address] [place] [id number] VIA In Basket Dear Mary Westfall MD, Thank you for referring Mr. Char Seaman to [facility] for evaluation. My notes for this consultation are attached. If you have questions, please do not hesitate to call me. I look forward to following your patient along with you. Sincerely, Jenny Peacock MD

## 2019-10-18 NOTE — PATIENT INSTRUCTIONS
-Recommend a CT heart scan   -Call to make an appt after you complete CT heart scan and cholesterol tests to review resutls

## 2019-10-18 NOTE — PROGRESS NOTES
Malorie Dong MD [facility]  [address]  [place], [address] [facility]    Office [phone]  Fax [phone]  Cell [phone]       Ivory Mccain is a 49 y.o. male self referred for evaluation of cardiac risk. Diagnoses  Encounter Diagnoses     ICD-10-CM ICD-9-CM   1. Sinus bradycardia R00.1 427.89   2. RBBB I45.10 426.4   3. History of prostate cancer Z85.46 V10.46       Assessment/Recommendations:    Intermediate CAD risk. No exertional sxs at a good functional capacity. Routine lipids from [facility] normal. Favor early detection package with CT heart scan and advanced lipid testing with [facility], regroup to review. Sinus Bradycardia, asx. He likely has conduction disease with RBBB. His father has a pacemaker. Reviewed sxs that would raise concern. He tracks his HR with an Apple 4 watch. Subjective:    Ivory Mccain has no previous cardiac hx. He reports he is here because of recent family hx of MI and stroke. He stays active at the gym and with snowboarding, with no exertional sxs. Patient denies any exertional chest pain, dyspnea, palpitations, syncope, orthopnea, edema or paroxysmal nocturnal dyspnea. He has a long hx of HR in the low 60's. Tracks his HR with his watch    He admits his diet could improve. His recent PSA was 0.4. He does not smoke, but uses cigars at times as well as smokeless tobacco. He drinks beer socially. 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      Cardiac risk factors   HTN no  DM  no  Smoking yes, occasional cigars  Family hx of CAD yes, father with heart disease      Cardiac testing  Holter 7/15/19 - normal      Past Medical History:   Diagnosis Date    Anxiety     takes xanax prn prescribed by Dr. [facility]y [facility])     Prostate    Prehypertension 5/31/2018    Skin cancer     Squamous cell Carcinoma of upper lip s/p Moh's    Sun-damaged skin         Social History     Socioeconomic History    Marital status:      Spouse name: Not on file    Number of children: Not on file    Years of education: Not on file    Highest education level: Not on file   Occupational History    Occupation: [employer]   Tobacco Use    Smoking status: Current Some Day Smoker     Types: Cigars    Smokeless tobacco: Current User     Types: Snuff    Tobacco comment: once a month   Substance and Sexual Activity    Alcohol use: Yes     Alcohol/week: 4.2 - 5.0 standard drinks     Types: 5 - 6 Standard drinks or equivalent per week     Comment: 5-6 a week    Drug use: No    Sexual activity: Yes     Partners: Female       Current Outpatient Medications   Medication Sig Dispense Refill    ALPRAZolam (XANAX) 0.5 mg tablet Take 1 Tab by mouth daily as needed for Anxiety. 30 Tab 0    aluminum chloride (DRYSOL) 20 % external solution Apply daily 60 mL 0    aluminum chloride (HYPERCARE) 15 % (w/v) liqd 60 mL by Apply Externally route daily. 1 Bottle 3    sildenafil, antihypertensive, (REVATIO) 20 mg tablet TAKE ONE TO FIVE TABLETS BY MOUTH AS NEEDED ONE HOUR PRIOR TO SEXUAL ACTIVITY  5    aspirin delayed-release 81 mg tablet Take 1 Tab by mouth daily. 30 Tab 3       No Known Allergies       Review of Symptoms:  Constitutional: Negative for fever, chills, malaise/fatigue and diaphoresis. Respiratory: Negative for cough, hemoptysis, sputum production, and wheezing. Cardiovascular: Negative for chest pain, palpitations, orthopnea, claudication, leg swelling and PND. Gastrointestinal: Negative for heartburn, nausea, vomiting, blood in stool and melena. Genitourinary: Negative for dysuria and flank pain. Musculoskeletal: Negative for joint pain and back pain. Skin: Negative for rash. Neurological: Negative for focal weakness, seizures, loss of consciousness, weakness and headaches. Endo/Heme/Allergies: Does not bruise/bleed easily. Psychiatric/Behavioral: Negative for memory loss. The patient does not have insomnia.       Physical Exam  Visit Vitals  BP 124/86 (BP 1 Location: Left arm, BP Patient Position: Sitting)   Pulse 66   Resp 16   Ht 6' (1.829 m)   Wt 220 lb (99.8 kg)   SpO2 95%   BMI 29.84 kg/m²     Wt Readings from Last 3 Encounters:   10/18/19 220 lb (99.8 kg)   06/26/19 225 lb (102.1 kg)   03/12/19 220 lb 6.4 oz (100 kg)     General - WDWN  HEENT: Eyes without jaundice, Hearing intact  Neck - JVP normal, thyroid nl  Cardiac - normal S1,S2, no murmurs, rubs or gallops. No clicks  Vascular - carotids without bruits, radials, femorals and pedal pulses equal bilateral  Lungs - clear to auscultation bilaterals, no rales ,wheezing or rhonchi  Abd - soft nontender, no HSM, no abd bruits  Extremities - no edema  Neuro - nonfocal, normal gait  Psych - mood and affect normal    Labs:  Lab Results   Component Value Date/Time    Cholesterol, total 181 06/26/2019 11:00 AM    HDL Cholesterol 69 06/26/2019 11:00 AM    LDL, calculated 92 06/26/2019 11:00 AM    VLDL, calculated 20 06/26/2019 11:00 AM    Triglyceride 102 06/26/2019 11:00 AM         Cardiographics  EKG 6/26/19  SB 46, RBBB otherwise normal.  Holter 7/15/19 - normal      Written by Harvy Schwab, as dictated by Radha Mtz M.D.      Radha Mtz MD

## 2019-10-18 NOTE — PROGRESS NOTES
Kimberley Morris is a 49 y.o. male    Chief Complaint   Patient presents with    Slow Heart Rate    Other     Fm Hx of MI       Visit Vitals  /86 (BP 1 Location: Left arm, BP Patient Position: Sitting)   Pulse 66   Resp 16   Ht 6' (1.829 m)   Wt 220 lb (99.8 kg)   SpO2 95%   BMI 29.84 kg/m²       1. Have you been to the ER, urgent care clinic since your last visit? Hospitalized since your last visit? No    2. Have you seen or consulted any other health care providers outside of the [address] [place] [address] since your last visit? Include any pap smears or colon screening.  No

## 2019-11-06 DIAGNOSIS — F41.9 ANXIETY: ICD-10-CM

## 2019-11-06 RX ORDER — ALPRAZOLAM 0.5 MG/1
0.5 TABLET ORAL
Qty: 30 TAB | Refills: 0 | Status: SHIPPED | OUTPATIENT
Start: 2019-11-06 | End: 2020-03-14 | Stop reason: SDUPTHER

## 2019-11-07 ENCOUNTER — HOSPITAL ENCOUNTER (OUTPATIENT)
Dept: CT IMAGING | Age: 54
Discharge: HOME | End: 2019-11-07
Payer: SELF-PAY

## 2019-11-07 DIAGNOSIS — Z00.00 PREVENTATIVE HEALTH CARE: ICD-10-CM

## 2019-11-07 PROCEDURE — 75571 CT HRT W/O DYE W/CA TEST: CPT

## 2019-11-11 ENCOUNTER — OFFICE VISIT (OUTPATIENT)
Dept: CARDIOLOGY CLINIC | Age: 54
End: 2019-11-11

## 2019-11-11 VITALS
RESPIRATION RATE: 20 BRPM | HEIGHT: 72 IN | SYSTOLIC BLOOD PRESSURE: 160 MMHG | BODY MASS INDEX: 29.53 KG/M2 | WEIGHT: 218 LBS | HEART RATE: 78 BPM | DIASTOLIC BLOOD PRESSURE: 100 MMHG | OXYGEN SATURATION: 97 %

## 2019-11-11 DIAGNOSIS — R79.89 LOW VITAMIN D LEVEL: ICD-10-CM

## 2019-11-11 DIAGNOSIS — E78.2 MIXED HYPERLIPIDEMIA WITH APOLIPOPROTEIN E4 VARIANT: ICD-10-CM

## 2019-11-11 DIAGNOSIS — Z85.46 HISTORY OF PROSTATE CANCER: ICD-10-CM

## 2019-11-11 DIAGNOSIS — E78.41 ELEVATED LP(A): ICD-10-CM

## 2019-11-11 DIAGNOSIS — R93.1 AGATSTON CAC SCORE, <100: Primary | ICD-10-CM

## 2019-11-11 RX ORDER — ACETAMINOPHEN 500 MG
2000 TABLET ORAL 2 TIMES DAILY
Qty: 30 CAP | Refills: 0
Start: 2019-11-11

## 2019-11-11 RX ORDER — ROSUVASTATIN CALCIUM 10 MG/1
10 TABLET, COATED ORAL
Qty: 30 TAB | Refills: 3 | Status: SHIPPED | OUTPATIENT
Start: 2019-11-11 | End: 2020-03-24 | Stop reason: SDUPTHER

## 2019-11-11 NOTE — LETTER
11/12/19 Patient: Bong Fox YOB: 1965 Date of Visit: 11/11/2019 Anselmo Hurst MD 
[address] [place] [address] [phone] [id number] VIA In Basket Dear Anselmo Hurst MD, Thank you for referring Mr. Dhaval Monroy to [facility] for evaluation. My notes for this consultation are attached. If you have questions, please do not hesitate to call me. I look forward to following your patient along with you. Sincerely, Reji Lopez MD

## 2019-11-11 NOTE — PROGRESS NOTES
Visit Vitals  BP (!) 160/100   Pulse 78   Resp 20   Ht 6' (1.829 m)   Wt 218 lb (98.9 kg)   SpO2 97%   BMI 29.57 kg/m²     REVIEW [facility] and CT heart scan

## 2019-11-11 NOTE — PATIENT INSTRUCTIONS
-Triglyceride goal less than 100  -Ideal ApoB should be less than 80  -You have 2 genetics markers that are abnormal: ApoE4 and Lp(a). -Start vitamin D3 2000 units daily  -Start aspirin 81mg daily  -Start Crestor 10mg daily  -Research Glycemic Index and Glycemic Load on the internet. Vegetables that are low in glycemic Index include artichokes, asparagus, bean sprouts, broccoli, brussels sprouts and cauliflower. Fruits that are low in glycemic index include cherries, grapefruit, dried apricots, pears, apples, oranges, plums and strawberry.

## 2019-11-11 NOTE — PROGRESS NOTES
Malorie Banerjee MD [facility] [place]  [address] [facility], [address] [facility]    Office [phone]  Fax [phone]  Cell [phone]       Sterling De Luna is a 49 y.o. male last seen by me 3 weeks ago. Diagnoses  Encounter Diagnoses     ICD-10-CM ICD-9-CM   1. Agatston CAC score, <100 R93.1 793.2   2. Mixed hyperlipidemia with apolipoprotein E4 variant E78.2 272.2   3. Elevated Lp(a) E78.41 272.8   4. History of prostate cancer Z85.46 V10.46   5. Low vitamin D level R79.89 790.6       Assessment/Recommendations:    CAD by recent CT heart scan (70). He has exertional sxs at a good functional capacity. Reviewed sxs that would raise concern. Drivers of risk = dyslipidemia with elevated Lp(a) and ApoE4 genotype. - Start ASA 81mg. d  - Start Cestor 10mg/d. Dyslipidemia. Advanced testing with [facility] reviewed in detail. Notable findings include LDL-c 94, ApoB 93, elevated Lp(a), ApoE3/4 genotype, , CRP 1.5, normal FBS/A1c. We had a good discussion about clean eating. He is motivated to make change. Start Crestor 10mg/d. Repeat labs in 3-4 months with [facility]. Low vitamin D (24). Start D3 2,000 IU daily. Follow-up and Dispositions    · Return in about 4 months (around 3/11/2020). Subjective:    Sterling De Luna reports he is feeling well overall. Patient denies any exertional chest pain, dyspnea, palpitations, syncope, orthopnea, edema or paroxysmal nocturnal dyspnea.         Cardiac risk factors   HTN no  DM  no  Smoking yes, occasional cigars  Family hx of CAD yes, father with heart disease      Cardiac testing  Holter 7/15/19 - normal  CT heart scan 11/7/19 - CAC 70 (LAD)      Past Medical History:   Diagnosis Date    Agatston CAC score, <100 11/11/2019    Anxiety     takes xanax prn prescribed by Dr. Lashonda Shearer [facility])     Prostate    Elevated Lp(a) 11/11/2019    Mixed hyperlipidemia with apolipoprotein E4 variant 11/11/2019    Prehypertension 5/31/2018    Skin cancer Squamous cell Carcinoma of upper lip s/p Moh's    Sun-damaged skin         Social History     Socioeconomic History    Marital status:      Spouse name: Not on file    Number of children: Not on file    Years of education: Not on file    Highest education level: Not on file   Occupational History    Occupation: [employer]   Tobacco Use    Smoking status: Current Some Day Smoker     Types: Cigars    Smokeless tobacco: Current User     Types: Snuff    Tobacco comment: once a month   Substance and Sexual Activity    Alcohol use: Yes     Alcohol/week: 4.2 - 5.0 standard drinks     Types: 5 - 6 Standard drinks or equivalent per week     Comment: 5-6 a week    Drug use: No    Sexual activity: Yes     Partners: Female       Current Outpatient Medications   Medication Sig Dispense Refill    cholecalciferol (VITAMIN D3) 2,000 unit cap capsule Take 2,000 Units by mouth two (2) times a day. 30 Cap 0    rosuvastatin (CRESTOR) 10 mg tablet Take 1 Tab by mouth nightly. 30 Tab 3    ALPRAZolam (XANAX) 0.5 mg tablet Take 1 Tab by mouth daily as needed for Anxiety. 30 Tab 0    aluminum chloride (DRYSOL) 20 % external solution Apply daily 60 mL 0    aluminum chloride (HYPERCARE) 15 % (w/v) liqd 60 mL by Apply Externally route daily. 1 Bottle 3    sildenafil, antihypertensive, (REVATIO) 20 mg tablet TAKE ONE TO FIVE TABLETS BY MOUTH AS NEEDED ONE HOUR PRIOR TO SEXUAL ACTIVITY  5       No Known Allergies       Review of Symptoms:  Constitutional: Negative for fever, chills, malaise/fatigue and diaphoresis. Respiratory: Negative for cough, hemoptysis, sputum production, and wheezing. Cardiovascular: Negative for chest pain, palpitations, orthopnea, claudication, leg swelling and PND. Gastrointestinal: Negative for heartburn, nausea, vomiting, blood in stool and melena. Genitourinary: Negative for dysuria and flank pain. Musculoskeletal: Negative for joint pain and back pain.   Skin: Negative for rash.  Neurological: Negative for focal weakness, seizures, loss of consciousness, weakness and headaches. Endo/Heme/Allergies: Does not bruise/bleed easily. Psychiatric/Behavioral: Negative for memory loss. The patient does not have insomnia. Physical Exam  Visit Vitals  BP (!) 160/100   Pulse 78   Resp 20   Ht 6' (1.829 m)   Wt 218 lb (98.9 kg)   SpO2 97%   BMI 29.57 kg/m²     Wt Readings from Last 3 Encounters:   11/11/19 218 lb (98.9 kg)   10/18/19 220 lb (99.8 kg)   06/26/19 225 lb (102.1 kg)     General - WDWN  HEENT: Eyes without jaundice, Hearing intact  Neck - JVP normal, thyroid nl  Cardiac - normal S1,S2, no murmurs, rubs or gallops. No clicks  Vascular - carotids without bruits, radials, femorals and pedal pulses equal bilateral  Lungs - clear to auscultation bilaterals, no rales ,wheezing or rhonchi  Abd - soft nontender, no HSM, no abd bruits  Extremities - no edema  Neuro - nonfocal, normal gait  Psych - mood and affect normal    Labs:  Lab Results   Component Value Date/Time    Cholesterol, total 181 06/26/2019 11:00 AM    HDL Cholesterol 69 06/26/2019 11:00 AM    LDL, calculated 92 06/26/2019 11:00 AM    VLDL, calculated 20 06/26/2019 11:00 AM    Triglyceride 102 06/26/2019 11:00 AM         Cardiographics  EKG 6/26/19  SB 46, RBBB otherwise normal.  Holter 7/15/19 - normal  CT heart scan 11/7/19 - CAC 70 (LAD)    Written by Milagros Ivan, as dictated by Theresa Valdivia M.D.      Theresa Valdivia MD

## 2020-02-24 DIAGNOSIS — Z20.828 EXPOSURE TO THE FLU: Primary | ICD-10-CM

## 2020-02-24 RX ORDER — OSELTAMIVIR PHOSPHATE 75 MG/1
75 CAPSULE ORAL 2 TIMES DAILY
Qty: 10 CAP | Refills: 0 | Status: SHIPPED | OUTPATIENT
Start: 2020-02-24 | End: 2020-02-29

## 2020-03-16 NOTE — PROGRESS NOTES
Malorie Chandler MD [facility]  [address]  [place], [address] [facility]    Office [phone]  Fax [phone]  Cell [phone]       Caitlin Collazo is a 49 y.o. male last seen by me 4 months ago. Diagnoses  Encounter Diagnoses     ICD-10-CM ICD-9-CM   1. Agatston CAC score, <100 R93.1 793.2   2. Mixed hyperlipidemia with apolipoprotein E4 variant E78.2 272.2   3. Elevated Lp(a) E78.41 272.8   4. Low vitamin D level R79.89 790.6       Assessment/Recommendations:    CAD by recent CT heart scan (70). He has exertional sxs at a good functional capacity. Reviewed sxs that would raise concern. Drivers of risk = dyslipidemia with elevated Lp(a) and ApoE4 genotype. - Continue ASA 81mg. d    Dyslipidemia w/ elevated Lp(a), ApoE3/4 genotype. Advanced testing with [facility] reviewed in detail. With the addition of Crestor LDL-c dropped from 94 to 50, ApoB from 93 to 66, TG from 124 to 108. Interval increase in FBS from 97 to 105. He is trying to eat .   - Continue Crestor 10mg/d. - Repeat labs in 6 months with [facility]. Low vitamin D (24) w/ D3 2,000 IU daily. Vitamin D has increased from 24 to 27.   - Continue current treatment. F/u in 6 months. Follow-up and Dispositions    · Return in about 6 months (around 9/17/2020). Subjective:    Caitlin Collazo reports feeling well. He reports exercising at [employer] weekly w/ wife. No exertional sxs. Patient denies any exertional chest pain, dyspnea, palpitations, syncope, orthopnea, edema or paroxysmal nocturnal dyspnea. Of note, his mother recently passed away. Cardiac risk factors   HTN no  DM  no  Smoking yes, occasional cigars  Family hx of CAD yes, father with heart disease stroke 2 years ago.       Cardiac testing  Holter 7/15/19 - normal  CT heart scan 11/7/19 - CAC 70 (LAD)      Past Medical History:   Diagnosis Date    Agatston CAC score, <100 11/11/2019    Anxiety     takes xanax prn prescribed by  [facility]    Cancer [facility])     Prostate    Elevated Lp(a) 11/11/2019    Mixed hyperlipidemia with apolipoprotein E4 variant 11/11/2019    Prehypertension 5/31/2018    Skin cancer     Squamous cell Carcinoma of upper lip s/p Moh's    Sun-damaged skin         Social History     Socioeconomic History    Marital status:      Spouse name: Not on file    Number of children: Not on file    Years of education: Not on file    Highest education level: Not on file   Occupational History    Occupation: [employer]   Tobacco Use    Smoking status: Current Some Day Smoker     Types: Cigars    Smokeless tobacco: Current User     Types: Snuff    Tobacco comment: once a month   Substance and Sexual Activity    Alcohol use: Yes     Alcohol/week: 4.2 - 5.0 standard drinks     Types: 5 - 6 Standard drinks or equivalent per week     Comment: 5-6 a week    Drug use: No    Sexual activity: Yes     Partners: Female       Current Outpatient Medications   Medication Sig Dispense Refill    aspirin 81 mg chewable tablet Take 1 Tab by mouth daily. 90 Tab 0    ALPRAZolam (XANAX) 0.5 mg tablet Take 1 Tab by mouth daily as needed for Anxiety. 15 Tab 0    cholecalciferol (VITAMIN D3) 2,000 unit cap capsule Take 2,000 Units by mouth two (2) times a day. 30 Cap 0    rosuvastatin (CRESTOR) 10 mg tablet Take 1 Tab by mouth nightly. 30 Tab 3    aluminum chloride (DRYSOL) 20 % external solution Apply daily 60 mL 0    aluminum chloride (HYPERCARE) 15 % (w/v) liqd 60 mL by Apply Externally route daily. 1 Bottle 3    sildenafil, antihypertensive, (REVATIO) 20 mg tablet TAKE ONE TO FIVE TABLETS BY MOUTH AS NEEDED ONE HOUR PRIOR TO SEXUAL ACTIVITY  5       No Known Allergies       Review of Symptoms:  Constitutional: Negative for fever, chills, malaise/fatigue and diaphoresis. Respiratory: Negative for cough, hemoptysis, sputum production, and wheezing.   Cardiovascular: Negative for chest pain, palpitations, orthopnea, claudication, leg swelling and PND. Gastrointestinal: Negative for heartburn, nausea, vomiting, blood in stool and melena. Genitourinary: Negative for dysuria and flank pain. Musculoskeletal: Negative for joint pain and back pain. Skin: Negative for rash. Neurological: Negative for focal weakness, seizures, loss of consciousness, weakness and headaches. Endo/Heme/Allergies: Does not bruise/bleed easily. Psychiatric/Behavioral: Negative for memory loss. The patient does not have insomnia. Physical Exam  Visit Vitals  /88 (BP 1 Location: Left arm, BP Patient Position: Sitting)   Pulse 80   Resp 16   Ht 6' (1.829 m)   Wt 223 lb (101.2 kg)   SpO2 98%   BMI 30.24 kg/m²     Wt Readings from Last 3 Encounters:   03/17/20 223 lb (101.2 kg)   11/11/19 218 lb (98.9 kg)   10/18/19 220 lb (99.8 kg)     General - WDWN  HEENT: Eyes without jaundice, Hearing intact  Neck - JVP normal, thyroid nl  Cardiac - normal S1,S2, no murmurs, rubs or gallops. No clicks  Vascular - carotids without bruits, radials, femorals and pedal pulses equal bilateral  Lungs - clear to auscultation bilaterals, no rales ,wheezing or rhonchi  Abd - soft nontender, no HSM, no abd bruits  Extremities - no edema  Neuro - nonfocal, normal gait  Psych - mood and affect normal    Labs:  Lab Results   Component Value Date/Time    Cholesterol, total 181 06/26/2019 11:00 AM    HDL Cholesterol 69 06/26/2019 11:00 AM    LDL, calculated 92 06/26/2019 11:00 AM    VLDL, calculated 20 06/26/2019 11:00 AM    Triglyceride 102 06/26/2019 11:00 AM         Cardiographics  EKG 6/26/19  SB 46, RBBB otherwise normal.  Holter 7/15/19 - normal  CT heart scan 11/7/19 - CAC 70 (LAD)    Written by Jessy Larson, as dictated by Jv Theodore M.D.      Jv Theodore MD

## 2020-03-17 ENCOUNTER — OFFICE VISIT (OUTPATIENT)
Dept: CARDIOLOGY CLINIC | Age: 55
End: 2020-03-17

## 2020-03-17 VITALS
HEART RATE: 80 BPM | SYSTOLIC BLOOD PRESSURE: 130 MMHG | RESPIRATION RATE: 16 BRPM | HEIGHT: 72 IN | BODY MASS INDEX: 30.2 KG/M2 | DIASTOLIC BLOOD PRESSURE: 88 MMHG | OXYGEN SATURATION: 98 % | WEIGHT: 223 LBS

## 2020-03-17 DIAGNOSIS — E78.2 MIXED HYPERLIPIDEMIA WITH APOLIPOPROTEIN E4 VARIANT: ICD-10-CM

## 2020-03-17 DIAGNOSIS — R79.89 LOW VITAMIN D LEVEL: ICD-10-CM

## 2020-03-17 DIAGNOSIS — R93.1 AGATSTON CAC SCORE, <100: Primary | ICD-10-CM

## 2020-03-17 DIAGNOSIS — E78.41 ELEVATED LP(A): ICD-10-CM

## 2020-03-17 RX ORDER — GUAIFENESIN 100 MG/5ML
81 LIQUID (ML) ORAL DAILY
Qty: 90 TAB | Refills: 0
Start: 2020-03-17

## 2020-03-17 NOTE — PROGRESS NOTES
Chief Complaint   Patient presents with    Cholesterol Problem    Follow-up     4 month     Visit Vitals  /88 (BP 1 Location: Left arm, BP Patient Position: Sitting)   Pulse 80   Resp 16   Ht 6' (1.829 m)   Wt 223 lb (101.2 kg)   SpO2 98%   BMI 30.24 kg/m²     Patient presents to office with no complaints of pain, swelling, SOB, dizziness, or chest pain. No refills needed. No recent hospitalizations reported.    Olayinka He LPN

## 2020-03-17 NOTE — LETTER
3/17/20 Patient: Kathi Pastrana YOB: 1965 Date of Visit: 3/17/2020 Ez Casiano MD 
[address] [place] [id number] VIA In Basket Dear Ez Casiano MD, Thank you for referring Mr. Kelli Garcia to [facility] for evaluation. My notes for this consultation are attached. If you have questions, please do not hesitate to call me. I look forward to following your patient along with you. Sincerely, Flakito Rico MD

## 2020-03-24 RX ORDER — ROSUVASTATIN CALCIUM 10 MG/1
10 TABLET, COATED ORAL
Qty: 90 TAB | Refills: 1 | Status: SHIPPED | OUTPATIENT
Start: 2020-03-24 | End: 2020-10-07

## 2020-03-24 NOTE — TELEPHONE ENCOUNTER
Requested Prescriptions     Pending Prescriptions Disp Refills    rosuvastatin (CRESTOR) 10 mg tablet 90 Tab 1     Sig: Take 1 Tab by mouth nightly.      Refill approved VO

## 2020-05-20 ENCOUNTER — TELEPHONE (OUTPATIENT)
Dept: CARDIOLOGY CLINIC | Age: 55
End: 2020-05-20

## 2020-05-20 NOTE — TELEPHONE ENCOUNTER
Spoke with [facility] and patient has a bill of $67.47 for deductible. [address] [place] [address] notified patient of this information.   Patient will call [facility] and insurance to follow up

## 2020-05-20 NOTE — TELEPHONE ENCOUNTER
Pt is calling about a bill that he got in Ref to lab work that he had done for Dr Sanjuana Plummer and the insurance is not covering it and he has reached out to billing and they told him to call us back please call him [phone]

## 2020-09-29 ENCOUNTER — OFFICE VISIT (OUTPATIENT)
Dept: CARDIOLOGY CLINIC | Age: 55
End: 2020-09-29
Payer: COMMERCIAL

## 2020-09-29 VITALS
HEART RATE: 61 BPM | DIASTOLIC BLOOD PRESSURE: 88 MMHG | WEIGHT: 217 LBS | BODY MASS INDEX: 29.43 KG/M2 | SYSTOLIC BLOOD PRESSURE: 138 MMHG

## 2020-09-29 DIAGNOSIS — R00.1 SINUS BRADYCARDIA: ICD-10-CM

## 2020-09-29 DIAGNOSIS — E78.41 ELEVATED LP(A): ICD-10-CM

## 2020-09-29 DIAGNOSIS — R03.0 ELEVATED BP WITHOUT DIAGNOSIS OF HYPERTENSION: ICD-10-CM

## 2020-09-29 DIAGNOSIS — R93.1 AGATSTON CAC SCORE, <100: Primary | ICD-10-CM

## 2020-09-29 DIAGNOSIS — R79.89 LOW VITAMIN D LEVEL: ICD-10-CM

## 2020-09-29 DIAGNOSIS — E78.2 MIXED HYPERLIPIDEMIA WITH APOLIPOPROTEIN E4 VARIANT: ICD-10-CM

## 2020-09-29 DIAGNOSIS — I45.10 RBBB: ICD-10-CM

## 2020-09-29 PROCEDURE — 99214 OFFICE O/P EST MOD 30 MIN: CPT | Performed by: SPECIALIST

## 2020-09-29 NOTE — PATIENT INSTRUCTIONS
- Increase Vitamin D3 to 4000 units a day - Please obtain fasting labs 2 weeks prior to next visit. - Please keep a log of your blood pressure over the next few weeks. Goal BP should be less than 130/80.

## 2020-09-29 NOTE — PROGRESS NOTES
Malorie Zaman MD [facility]  [id number]  [place], [address] [facility]    Office [phone]  Fax [phone]  Cell [phone]       Taylor Montalvo is a 48 y.o. male last seen by me 6 months ago. Diagnoses  Encounter Diagnoses     ICD-10-CM ICD-9-CM   1. Agatston CAC score, <100  R93.1 793.2   2. Elevated Lp(a)  E78.41 272.8   3. Sinus bradycardia  R00.1 427.89   4. RBBB  I45.10 426.4   5. Mixed hyperlipidemia with apolipoprotein E4 variant  E78.2 272.2   6. Low vitamin D level  R79.89 790.6   7. Elevated BP without diagnosis of hypertension  R03.0 796.2       Assessment/Recommendations:    CAD by CT heart scan Nov 2019 w/ CAC 70. He has no exertional sxs at a good functional capacity. Reviewed sxs that would raise concern. Drivers of risk = dyslipidemia with elevated Lp(a) and ApoE4 genotype. - Continue ASA 81mg. d    Dyslipidemia w/ elevated Lp(a), ApoE4 genotype. Advanced testing with [facility] reviewed in detail. Lipids and lipoproteins optimized- LDL 38, ApoB 59, A1c 5.4%, but . Overall, his numbers are good. - Continue Crestor 10mg/d. - Repeat labs in 6 months with [facility]. Vitamin D insufficiency. Updated level 30. He is currently taking D3 2000 units daily.  - Increase Vitamin D to 4000 units daily. Target level 40-50.   - Recheck in 6 months     Elevated BP w/o hx of HTN. Suspect white coat phenomena. Encouraged him to check his BP at home over the next 1-2 weeks. Follow-up and Dispositions    · Return in about 6 months (around 3/29/2021). Subjective:    Taylor Montalvo reports no interval cardiac sxs. Patient denies any exertional chest pain, dyspnea, palpitations, syncope, orthopnea, edema or paroxysmal nocturnal dyspnea. He reports remaining active by walking his dog and playing golf w/o any exertional sxs. He notes that he has not been back to the gym in awhile.     He states that his diet is mostly clean and that he thinks he may have lost a couple pounds. He does report that he likes eating sandwiches. He notes that his BP seems elevated. He does not regularly check it. He reports random pattern of cramping in his side. 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 Cardiac risk factors   HTN no  DM  no  Smoking yes, occasional cigars  Family hx of CAD yes, father with heart disease stroke 2 years ago. Cardiac testing  Holter 7/15/19 - normal  CT heart scan 11/7/19 - CAC 70 (LAD)      Past Medical History:   Diagnosis Date    Agatston CAC score, <100 11/11/2019    Anxiety     takes xanax prn prescribed by Dr. Ariana Florez [facility])     Prostate    Elevated Lp(a) 11/11/2019    Mixed hyperlipidemia with apolipoprotein E4 variant 11/11/2019    Prehypertension 5/31/2018    Skin cancer     Squamous cell Carcinoma of upper lip s/p Moh's    Sun-damaged skin         Social History     Socioeconomic History    Marital status:      Spouse name: Not on file    Number of children: Not on file    Years of education: Not on file    Highest education level: Not on file   Occupational History    Occupation: [employer]   Tobacco Use    Smoking status: Current Some Day Smoker     Types: Cigars    Smokeless tobacco: Current User     Types: Snuff    Tobacco comment: once a month   Substance and Sexual Activity    Alcohol use: Yes     Alcohol/week: 4.2 - 5.0 standard drinks     Types: 5 - 6 Standard drinks or equivalent per week     Comment: 5-6 a week    Drug use: No    Sexual activity: Yes     Partners: Female       Current Outpatient Medications   Medication Sig Dispense Refill    rosuvastatin (CRESTOR) 10 mg tablet Take 1 Tab by mouth nightly. 90 Tab 1    aspirin 81 mg chewable tablet Take 1 Tab by mouth daily. 90 Tab 0    ALPRAZolam (XANAX) 0.5 mg tablet Take 1 Tab by mouth daily as needed for Anxiety. 15 Tab 0    cholecalciferol (VITAMIN D3) 2,000 unit cap capsule Take 2,000 Units by mouth two (2) times a day.  30 Cap 0    sildenafil, antihypertensive, (REVATIO) 20 mg tablet TAKE ONE TO FIVE TABLETS BY MOUTH AS NEEDED ONE HOUR PRIOR TO SEXUAL ACTIVITY  5    aluminum chloride (DRYSOL) 20 % external solution Apply daily 60 mL 0    aluminum chloride (HYPERCARE) 15 % (w/v) liqd 60 mL by Apply Externally route daily. 1 Bottle 3       No Known Allergies       Review of Symptoms:  Constitutional: Negative for fever, chills, malaise/fatigue and diaphoresis. Respiratory: Negative for cough, hemoptysis, sputum production, and wheezing. Cardiovascular: Negative for chest pain, palpitations, orthopnea, claudication, leg swelling and PND. Gastrointestinal: Negative for heartburn, nausea, vomiting, blood in stool and melena. Genitourinary: Negative for dysuria and flank pain. Musculoskeletal: Negative for joint pain and back pain. Skin: Negative for rash. Neurological: Negative for focal weakness, seizures, loss of consciousness, weakness and headaches. Endo/Heme/Allergies: Does not bruise/bleed easily. Psychiatric/Behavioral: Negative for memory loss. The patient does not have insomnia. Physical Exam  Visit Vitals  /88 (BP 1 Location: Left arm, BP Patient Position: Sitting)   Pulse 61   Wt 217 lb (98.4 kg)   BMI 29.43 kg/m²     Wt Readings from Last 3 Encounters:   09/29/20 217 lb (98.4 kg)   03/17/20 223 lb (101.2 kg)   11/11/19 218 lb (98.9 kg)     General - WDWN  HEENT: Eyes without jaundice, Hearing intact  Neck - JVP normal, thyroid nl  Cardiac - normal S1,S2, no murmurs, rubs or gallops.  No clicks  Vascular - carotids without bruits, radials, femorals and pedal pulses equal bilateral  Lungs - clear to auscultation bilaterals, no rales ,wheezing or rhonchi  Abd - soft nontender, no HSM, no abd bruits  Extremities - no edema  Neuro - nonfocal, normal gait  Psych - mood and affect normal    Labs:  Lab Results   Component Value Date/Time    Cholesterol, total 181 06/26/2019 11:00 AM    HDL Cholesterol 69 06/26/2019 11:00 AM    LDL, calculated 92 06/26/2019 11:00 AM    VLDL, calculated 20 06/26/2019 11:00 AM    Triglyceride 102 06/26/2019 11:00 AM         Cardiographics  EKG 6/26/19  SB 46, RBBB otherwise normal.  Holter 7/15/19 - normal  CT heart scan 11/7/19 - CAC 70 (LAD)    Written by Tiesha Diaz, as dictated by Jose Goff M.D.      Jose Goff MD

## 2020-09-29 NOTE — LETTER
9/29/20 Patient: Mauricio Soriano YOB: 1965 Date of Visit: 9/29/2020 Fidel Jaramillo MD 
[address] [place] [id number] VIA In Basket Dear Fidel Jaramillo MD, Thank you for referring Mr. Halbert Runner to [facility] for evaluation. My notes for this consultation are attached. If you have questions, please do not hesitate to call me. I look forward to following your patient along with you. Sincerely, Gideon Luque MD

## 2020-09-29 NOTE — PROGRESS NOTES
Visit Vitals  /88 (BP 1 Location: Left arm, BP Patient Position: Sitting)   Pulse 61   Wt 217 lb (98.4 kg)   BMI 29.43 kg/m²

## 2020-10-05 ENCOUNTER — TELEPHONE (OUTPATIENT)
Dept: CARDIOLOGY CLINIC | Age: 55
End: 2020-10-05

## 2020-10-05 NOTE — TELEPHONE ENCOUNTER
----- Message from Collins Reyna MD sent at 9/29/2020  5:14 PM EDT -----  Regarding: labs  Can you send him [facility]L followup - include A1c, Vit D - 6 months    Dx prediabetes, vit d insufficiency

## 2020-11-02 DIAGNOSIS — F41.9 ANXIETY: ICD-10-CM

## 2020-11-02 RX ORDER — ALPRAZOLAM 0.5 MG/1
0.5 TABLET ORAL
Qty: 15 TAB | Refills: 0 | Status: CANCELLED | OUTPATIENT
Start: 2020-11-02

## 2020-11-02 NOTE — TELEPHONE ENCOUNTER
Last office visit 6/26/2019  Last med refill   Confirmed patient id and advised that he will need to have a visit.   Set up virtual visit for 11/3

## 2020-11-03 ENCOUNTER — VIRTUAL VISIT (OUTPATIENT)
Dept: PRIMARY CARE CLINIC | Age: 55
End: 2020-11-03
Payer: COMMERCIAL

## 2020-11-03 DIAGNOSIS — E78.2 MIXED HYPERLIPIDEMIA: Primary | ICD-10-CM

## 2020-11-03 DIAGNOSIS — R00.1 SINUS BRADYCARDIA: ICD-10-CM

## 2020-11-03 DIAGNOSIS — Z23 NEED FOR DIPHTHERIA-TETANUS-PERTUSSIS (TDAP) VACCINE: ICD-10-CM

## 2020-11-03 DIAGNOSIS — F41.9 ANXIETY: ICD-10-CM

## 2020-11-03 PROCEDURE — 99213 OFFICE O/P EST LOW 20 MIN: CPT | Performed by: INTERNAL MEDICINE

## 2020-11-03 RX ORDER — ALPRAZOLAM 0.5 MG/1
0.5 TABLET ORAL
Qty: 15 TAB | Refills: 0 | Status: SHIPPED | OUTPATIENT
Start: 2020-11-03 | End: 2021-06-02 | Stop reason: SDUPTHER

## 2020-11-03 NOTE — PROGRESS NOTES
Written by Ludmila Esteban, as dictated by Dr. Sam Ellington MD.    Izabella Rolwand is a 48 y.o. male. HPI  I was in the office while conducting this encounter. Consent:  He and/or his healthcare decision maker is aware that this patient-initiated Telehealth encounter is a billable service, with coverage as determined by his insurance carrier. He is aware that he may receive a bill and has provided verbal consent to proceed: Yes    This virtual visit was conducted via [address] Pursuant to the emergency declaration under the [facility], [facility] waiver authority and the [employer] Act, this Virtual  Visit was conducted to reduce the patient's risk of exposure to COVID-19 and provide continuity of care for an established patient. Services were provided through a video synchronous discussion virtually to substitute for in-person clinic visit. Due to this being a TeleHealth evaluation, many elements of the physical examination are unable to be assessed. Pt presents virtually today for a medication refill. He needs a refill of Xanax and uses it every couple of weeks PRN for anxiety. He last had a refill in 03/2020. He follows with Dr. Jessica Delgado in cardiology and has all the testing needed for his heart conditions every 6 months. His next f/u appt is in 03/2021, and he has not heard whether Dr. Jessica Delgado is leaving [employer]. He is concerned about his son Sahil Murillo, as he has been struggling with some lack of concentration at school. He inquires who he should call for an evaluation for him for ADHD. It has also been a stressful time at school, and one of his fraternity brothers recently committed suicide. He has been struggling with the loss if his grandmother, and his father wants to get some support for him during this hard time.      He has not gotten the flu vaccine yet, but he is planning to soon. He also has not gotten the Shingrix vaccine yet and is a bit young for it, but he does note that he is due for the Tdap vaccine. Patient Active Problem List   Diagnosis Code    History of prostate cancer Z85.46    Sinus bradycardia R00.1    RBBB I45.10    Mixed hyperlipidemia with apolipoprotein E4 variant E78.2    Elevated Lp(a) E78.41    Agatston CAC score, <100 R93.1    Low vitamin D level R79.89        Current Outpatient Medications on File Prior to Visit   Medication Sig Dispense Refill    rosuvastatin (CRESTOR) 10 mg tablet TAKE 1 TABLET BY MOUTH EVERY DAY AT NIGHT 90 Tab 3    aspirin 81 mg chewable tablet Take 1 Tab by mouth daily. 90 Tab 0    ALPRAZolam (XANAX) 0.5 mg tablet Take 1 Tab by mouth daily as needed for Anxiety. 15 Tab 0    cholecalciferol (VITAMIN D3) 2,000 unit cap capsule Take 2,000 Units by mouth two (2) times a day. 30 Cap 0    aluminum chloride (DRYSOL) 20 % external solution Apply daily 60 mL 0    aluminum chloride (HYPERCARE) 15 % (w/v) liqd 60 mL by Apply Externally route daily. 1 Bottle 3    sildenafil, antihypertensive, (REVATIO) 20 mg tablet TAKE ONE TO FIVE TABLETS BY MOUTH AS NEEDED ONE HOUR PRIOR TO SEXUAL ACTIVITY  5     No current facility-administered medications on file prior to visit.         No Known Allergies    Past Medical History:   Diagnosis Date    Agatston CAC score, <100 11/11/2019    Anxiety     takes xanax prn prescribed by Dr. Prakash Guerrero [facility])     Prostate    Elevated Lp(a) 11/11/2019    Mixed hyperlipidemia with apolipoprotein E4 variant 11/11/2019    Prehypertension 5/31/2018    Skin cancer     Squamous cell Carcinoma of upper lip s/p Moh's    Sun-damaged skin        Past Surgical History:   Procedure Laterality Date    HX APPENDECTOMY      HX CRYOABLATION OF THE PROSTATE      HX OTHER SURGICAL      Meckel's  diverticulum    HX VASECTOMY         Family History   Problem Relation Age of Onset    Cancer Mother uterine    Heart Disease Father         52's    Pacemaker Father 76       Social History     Socioeconomic History    Marital status:      Spouse name: Not on file    Number of children: Not on file    Years of education: Not on file    Highest education level: Not on file   Occupational History    Occupation: [employer] resource strain: Not on file    Food insecurity     Worry: Not on file     Inability: Not on file   [employer] needs     Medical: Not on file     Non-medical: Not on file   Tobacco Use    Smoking status: Current Some Day Smoker     Types: Cigars    Smokeless tobacco: Current User     Types: Snuff    Tobacco comment: once a month   Substance and Sexual Activity    Alcohol use: Yes     Alcohol/week: 4.2 - 5.0 standard drinks     Types: 5 - 6 Standard drinks or equivalent per week     Comment: 5-6 a week    Drug use: No    Sexual activity: Yes     Partners: Female   Lifestyle    Physical activity     Days per week: Not on file     Minutes per session: Not on file    Stress: Not on file   Relationships    Social connections     Talks on phone: Not on file     Gets together: Not on file     Attends Pent[employer]al service: Not on file     Active member of club or organization: Not on file     Attends meetings of clubs or organizations: Not on file     Relationship status: Not on file    Intimate partner violence     Fear of current or ex partner: Not on file     Emotionally abused: Not on file     Physically abused: Not on file     Forced sexual activity: Not on file   Other Topics Concern    Not on file   Social History Narrative    Not on file       No visits with results within 3 Month(s) from this visit.    Latest known visit with results is:   Office Visit on 06/26/2019   Component Date Value Ref Range Status    Glucose 06/26/2019 99  65 - 99 mg/dL Final    BUN 06/26/2019 12  6 - 24 mg/dL Final    Creatinine 06/26/2019 0.93  0.76 - 1.27 mg/dL Final    GFR est non-AA 06/26/2019 93  >59 mL/min/1.73 Final    GFR est AA 06/26/2019 108  >59 mL/min/1.73 Final    BUN/Creatinine ratio 06/26/2019 13  9 - 20 Final    Sodium 06/26/2019 142  134 - 144 mmol/L Final    Potassium 06/26/2019 4.6  3.5 - 5.2 mmol/L Final    Chloride 06/26/2019 108* 96 - 106 mmol/L Final    CO2 06/26/2019 21  20 - 29 mmol/L Final    Calcium 06/26/2019 8.9  8.7 - 10.2 mg/dL Final    Protein, total 06/26/2019 6.4  6.0 - 8.5 g/dL Final    Albumin 06/26/2019 4.1  3.5 - 5.5 g/dL Final    GLOBULIN, TOTAL 06/26/2019 2.3  1.5 - 4.5 g/dL Final    A-G Ratio 06/26/2019 1.8  1.2 - 2.2 Final    Bilirubin, total 06/26/2019 0.3  0.0 - 1.2 mg/dL Final    Alk. phosphatase 06/26/2019 62  39 - 117 IU/L Final    AST (SGOT) 06/26/2019 20  0 - 40 IU/L Final    ALT (SGPT) 06/26/2019 28  0 - 44 IU/L Final    Cholesterol, total 06/26/2019 181  100 - 199 mg/dL Final    Triglyceride 06/26/2019 102  0 - 149 mg/dL Final    HDL Cholesterol 06/26/2019 69  >39 mg/dL Final    VLDL, calculated 06/26/2019 20  5 - 40 mg/dL Final    LDL, calculated 06/26/2019 92  0 - 99 mg/dL Final    WBC 06/26/2019 5.4  3.4 - 10.8 x10E3/uL Final    RBC 06/26/2019 4.82  4.14 - 5.80 x10E6/uL Final    HGB 06/26/2019 14.2  13.0 - 17.7 g/dL Final    HCT 06/26/2019 44.5  37.5 - 51.0 % Final    MCV 06/26/2019 92  79 - 97 fL Final    MCH 06/26/2019 29.5  26.6 - 33.0 pg Final    MCHC 06/26/2019 31.9  31.5 - 35.7 g/dL Final    RDW 06/26/2019 14.0  12.3 - 15.4 % Final    PLATELET 33/88/7349 790  150 - 450 x10E3/uL Final    TSH 06/26/2019 2.660  0.450 - 4.500 uIU/mL Final    VITAMIN D, 25-HYDROXY 06/26/2019 22.7* 30.0 - 100.0 ng/mL Final    Comment: Vitamin D deficiency has been defined by the [place] of  Medicine and an Endocrine Society practice guideline as a  level of serum 25-OH vitamin D less than 20 ng/mL (1,2).   The Endocrine Society went on to further define vitamin D  insufficiency as a level between 21 and 29 ng/mL (2). 1. IOM ([place] of Medicine). 2010. Dietary reference     intakes for calcium and D. [facility]: The     [employer]. 2. Freids MF, Ashley RAMIREZ, Inderjit SCHWAB, et al.     Evaluation, treatment, and prevention of vitamin D     deficiency: an Endocrine Society clinical practice     guideline. JCEM. 2011 Jul; 96(7):1911-30. Review of Systems   Constitutional: Negative for malaise/fatigue and weight loss. HENT: Negative for congestion and sore throat. Eyes: Negative for blurred vision. Respiratory: Negative for cough and shortness of breath. Cardiovascular: Negative for chest pain and leg swelling. Gastrointestinal: Negative for constipation and heartburn. Genitourinary: Negative for frequency and urgency. Musculoskeletal: Negative for back pain, joint pain and myalgias. Neurological: Negative for dizziness and headaches. Psychiatric/Behavioral: Negative for depression. The patient is nervous/anxious (occasional anxiety attacks). The patient does not have insomnia. There were no vitals taken for this visit. Physical Exam  Nursing note reviewed. Constitutional:       General: He is not in acute distress. Appearance: Normal appearance. He is not diaphoretic. HENT:      Head: Normocephalic and atraumatic. Right Ear: Hearing normal.      Left Ear: Hearing normal.      Nose: No congestion. Eyes:      General:         Right eye: No discharge. Left eye: No discharge. Conjunctiva/sclera: Conjunctivae normal.   Neck:      Musculoskeletal: Normal range of motion. Pulmonary:      Effort: Pulmonary effort is normal. No respiratory distress. Breath sounds: Normal air entry. Neurological:      Mental Status: He is alert and oriented to person, place, and time. Mental status is at baseline. Psychiatric:         Mood and Affect: Mood normal.         Behavior: Behavior normal.         Thought Content:  Thought content normal. ASSESSMENT and PLAN    ICD-10-CM ICD-9-CM    1. Mixed hyperlipidemia  E78.2 272.2 Stable, continues on Crestor every day. 2. Sinus bradycardia  R00.1 427.89 Pt follows with Dr. Colton High, and I recommended that he check with him to see if he is leaving [facility]. 3. Anxiety  F41.9 300.00 ALPRAZolam (XANAX) 0.5 mg tablet sent to pharmacy. I refilled his Xanax.  reviewed. 4. Need for diphtheria-tetanus-pertussis (Tdap) vaccine  Z23 V06.1 diphth,pertus,acell,,tetanus (BOOSTRIX TDAP) 2.5-8-5 Lf-mcg-Lf/0.5mL susp suspension sent to pharmacy. Potential side effects were discussed. I prescribed the Tdap vaccine for health maintenance. Additional comments: I instructed him to take Kristi Gayle to Dr. Salinas for evaluation, and he can call her at  [phone] to schedule an appt. This plan was reviewed with the patient and patient agrees. All questions were answered. This scribe documentation was reviewed by me and accurately reflects the examination and decisions made by me.

## 2020-12-22 DIAGNOSIS — T75.3XXA MOTION SICKNESS, INITIAL ENCOUNTER: Primary | ICD-10-CM

## 2020-12-22 RX ORDER — SCOLOPAMINE TRANSDERMAL SYSTEM 1 MG/1
1 PATCH, EXTENDED RELEASE TRANSDERMAL
Qty: 3 PATCH | Refills: 0 | Status: SHIPPED | OUTPATIENT
Start: 2020-12-22 | End: 2020-12-31

## 2020-12-22 NOTE — TELEPHONE ENCOUNTER
Hello.  My family of 5 who are all patients of yours are going on a sailing trip in the [place]. Will you please prescribe us the sea sickness patches for 7 days? Minnie Robins, [place]. We use the [facility] on [address]. Please let m know. Thank you.

## 2021-06-02 DIAGNOSIS — F41.9 ANXIETY: ICD-10-CM

## 2021-06-02 RX ORDER — ALPRAZOLAM 0.5 MG/1
0.5 TABLET ORAL
Qty: 15 TABLET | Refills: 0 | Status: SHIPPED | OUTPATIENT
Start: 2021-06-02 | End: 2021-10-11 | Stop reason: SDUPTHER

## 2021-10-11 DIAGNOSIS — F41.9 ANXIETY: ICD-10-CM

## 2021-10-11 RX ORDER — ALPRAZOLAM 0.5 MG/1
0.5 TABLET ORAL
Qty: 15 TABLET | Refills: 0 | Status: SHIPPED | OUTPATIENT
Start: 2021-10-11 | End: 2022-03-21 | Stop reason: ALTCHOICE

## 2021-10-11 NOTE — TELEPHONE ENCOUNTER
Requested Prescriptions     Pending Prescriptions Disp Refills    ALPRAZolam (XANAX) 0.5 mg tablet 15 Tablet 0     Sig: Take 1 Tablet by mouth nightly as needed for Anxiety for up to 15 doses. Max Daily Amount: 0.5 mg.         Last Visit 11/3/20  Last Refill 6/2/21

## 2022-01-29 DIAGNOSIS — F41.9 ANXIETY: ICD-10-CM

## 2022-01-31 RX ORDER — ALPRAZOLAM 0.5 MG/1
0.5 TABLET ORAL
Qty: 15 TABLET | Refills: 0 | OUTPATIENT
Start: 2022-01-31

## 2022-01-31 NOTE — TELEPHONE ENCOUNTER
Requested Prescriptions     Pending Prescriptions Disp Refills    ALPRAZolam (XANAX) 0.5 mg tablet 15 Tablet 0     Sig: Take 1 Tablet by mouth nightly as needed for Anxiety for up to 15 doses. Max Daily Amount: 0.5 mg.         Last Visit 11/3/20  Last Refill

## 2022-01-31 NOTE — TELEPHONE ENCOUNTER
Called and left a VM for the patient- has not been seen since 2020, he will need to return the call to office to make an appointment, unable to refill the Xanax

## 2022-03-18 PROBLEM — Z85.46 HISTORY OF PROSTATE CANCER: Status: ACTIVE | Noted: 2019-06-26

## 2022-03-18 PROBLEM — R93.1 AGATSTON CAC SCORE, <100: Status: ACTIVE | Noted: 2019-11-11

## 2022-03-19 PROBLEM — E78.2 MIXED HYPERLIPIDEMIA WITH APOLIPOPROTEIN E4 VARIANT: Status: ACTIVE | Noted: 2019-11-11

## 2022-03-19 PROBLEM — R79.89 LOW VITAMIN D LEVEL: Status: ACTIVE | Noted: 2020-03-17

## 2022-03-19 PROBLEM — R00.1 SINUS BRADYCARDIA: Status: ACTIVE | Noted: 2019-10-18

## 2022-03-19 PROBLEM — E78.41 ELEVATED LP(A): Status: ACTIVE | Noted: 2019-11-11

## 2022-03-20 PROBLEM — I45.10 RBBB: Status: ACTIVE | Noted: 2019-10-18

## 2022-03-21 ENCOUNTER — OFFICE VISIT (OUTPATIENT)
Dept: PRIMARY CARE CLINIC | Age: 57
End: 2022-03-21
Payer: COMMERCIAL

## 2022-03-21 VITALS
HEART RATE: 64 BPM | DIASTOLIC BLOOD PRESSURE: 81 MMHG | RESPIRATION RATE: 18 BRPM | WEIGHT: 218 LBS | OXYGEN SATURATION: 97 % | TEMPERATURE: 98.4 F | HEIGHT: 72 IN | BODY MASS INDEX: 29.53 KG/M2 | SYSTOLIC BLOOD PRESSURE: 124 MMHG

## 2022-03-21 DIAGNOSIS — Z85.46 HISTORY OF PROSTATE CANCER: ICD-10-CM

## 2022-03-21 DIAGNOSIS — Z00.00 PHYSICAL EXAM: Primary | ICD-10-CM

## 2022-03-21 DIAGNOSIS — Z23 NEED FOR VACCINATION AGAINST STREPTOCOCCUS PNEUMONIAE: ICD-10-CM

## 2022-03-21 DIAGNOSIS — Z23 NEED FOR SHINGLES VACCINE: ICD-10-CM

## 2022-03-21 DIAGNOSIS — F41.9 ANXIETY: ICD-10-CM

## 2022-03-21 DIAGNOSIS — E78.2 MIXED HYPERLIPIDEMIA: ICD-10-CM

## 2022-03-21 DIAGNOSIS — Z23 NEED FOR DIPHTHERIA-TETANUS-PERTUSSIS (TDAP) VACCINE: ICD-10-CM

## 2022-03-21 DIAGNOSIS — Z11.59 NEED FOR HEPATITIS C SCREENING TEST: ICD-10-CM

## 2022-03-21 PROBLEM — R79.89 LOW VITAMIN D LEVEL: Status: RESOLVED | Noted: 2020-03-17 | Resolved: 2022-03-21

## 2022-03-21 PROCEDURE — 90732 PPSV23 VACC 2 YRS+ SUBQ/IM: CPT | Performed by: INTERNAL MEDICINE

## 2022-03-21 PROCEDURE — 99396 PREV VISIT EST AGE 40-64: CPT | Performed by: INTERNAL MEDICINE

## 2022-03-21 PROCEDURE — 90471 IMMUNIZATION ADMIN: CPT | Performed by: INTERNAL MEDICINE

## 2022-03-21 RX ORDER — ALPRAZOLAM 0.5 MG/1
0.5 TABLET ORAL
Qty: 15 TABLET | Refills: 0 | Status: SHIPPED | OUTPATIENT
Start: 2022-03-21

## 2022-03-21 RX ORDER — ZOSTER VACCINE RECOMBINANT, ADJUVANTED 50 MCG/0.5
0.5 KIT INTRAMUSCULAR ONCE
Qty: 0.5 ML | Refills: 0 | Status: SHIPPED | OUTPATIENT
Start: 2022-03-21 | End: 2022-03-21

## 2022-03-21 NOTE — PROGRESS NOTES
Donzetta Burkitt (: 1965) is a 55 y.o. male, established patient, here for evaluation of the following chief complaint(s):  Medication Refill (labs) and Physical     Written by Caroline Traylor, as dictated by Dr. Josefa Kimbrough MD.      ASSESSMENT/PLAN:  Below is the assessment and plan developed based on review of pertinent history, physical exam, labs, studies, and medications. 1. Physical exam  Physical exam today normal. I asked him to have Dr. Estela Berry (cardiology) office send me reports of lab work. 2. Need for hepatitis C screening test  Will check hepatitis C AB.   -     HEPATITIS C AB; Future    3. Mixed hyperlipidemia  Will check NMR lipoprofile. Continue on rosuvastatin 10 mg daily.   -     NMR LIPOPROFILE WITH LIPIDS (WITHOUT GRAPH); Future    4. History of prostate cancer  Followed by Urology. 5. Need for diphtheria-tetanus-pertussis (Tdap) vaccine  I rx'd tdap vaccine to be administered at his pharmacy. Potential side effects were discussed. -     diphth,pertus,acell,,tetanus (BOOSTRIX TDAP) 2.5-8-5 Lf-mcg-Lf/0.5mL susp suspension; 0.5 mL by IntraMUSCular route once for 1 dose., Normal, Disp-0.5 mL, R-0 sent to pharmacy. 6. Need for shingles vaccine  I rx'd Shingrix to be administered at his pharmacy. Potential side effects were discussed. -     varicella-zoster recombinant, PF, (Shingrix, PF,) 50 mcg/0.5 mL susr injection; 0.5 mL by IntraMUSCular route once for 1 dose., Normal, Disp-0.5 mL, R-0 sent to pharmacy. 7. Need for vaccination against Streptococcus pneumoniae  Pneumonia 23 administered in office today. He tolerated this well with no immediate complications.   -     PNEUMOCOCCAL POLYSACCHARIDE VACCINE, 23-VALENT, ADULT OR IMMUNOSUPPRESSED PT DOSE,    8. Anxiety  Well controlled on PRN Xanax 0.5 mg. Continue on current medication(s) which I refilled today. -     ALPRAZolam (XANAX) 0.5 mg tablet;  Take 1 Tablet by mouth nightly as needed for Anxiety for up to 15 doses. Max Daily Amount: 0.5 mg., Normal, Disp-15 Tablet, R-0 sent to pharmacy. SUBJECTIVE/OBJECTIVE:  HPI   The patient presents today for his annual physical exam. He is followed by Dr. Jayson Alvares (cardiology) every 6 months and gets lab work done with him. He is requesting a refill of Xanax 0.5 mg PRN for anxiety. He does not take this frequently. 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 He is on rosuvastatin 10 mg daily for HLD. He has a hx of prostate cancer and is followed by Urology. His PSA was WNL when last checked. He takes vitamin d3 supplement daily. He follows with Dermatology every 6 months for a skin exam.     Patient Active Problem List   Diagnosis Code    History of prostate cancer Z85.46    Sinus bradycardia R00.1    RBBB I45.10    Mixed hyperlipidemia with apolipoprotein E4 variant E78.2    Elevated Lp(a) E78.41    Agatston CAC score, <100 R93.1        Current Outpatient Medications on File Prior to Visit   Medication Sig Dispense Refill    rosuvastatin (CRESTOR) 10 mg tablet TAKE 1 TABLET BY MOUTH EVERY DAY AT NIGHT 90 Tab 3    aspirin 81 mg chewable tablet Take 1 Tab by mouth daily. 90 Tab 0    cholecalciferol (VITAMIN D3) 2,000 unit cap capsule Take 2,000 Units by mouth two (2) times a day. 30 Cap 0    aluminum chloride (DRYSOL) 20 % external solution Apply daily 60 mL 0    sildenafil, antihypertensive, (REVATIO) 20 mg tablet TAKE ONE TO FIVE TABLETS BY MOUTH AS NEEDED ONE HOUR PRIOR TO SEXUAL ACTIVITY  5    [DISCONTINUED] ALPRAZolam (XANAX) 0.5 mg tablet Take 1 Tablet by mouth nightly as needed for Anxiety for up to 15 doses. Max Daily Amount: 0.5 mg. 15 Tablet 0    [DISCONTINUED] ALPRAZolam (XANAX) 0.5 mg tablet Take 1 Tab by mouth daily as needed for Anxiety. 15 Tab 0    aluminum chloride (HYPERCARE) 15 % (w/v) liqd 60 mL by Apply Externally route daily. 1 Bottle 3     No current facility-administered medications on file prior to visit.        No Known Allergies    Past Medical History:   Diagnosis Date    Agatston CAC score, <100 11/11/2019    Anxiety     takes xanax prn prescribed by Dr. Sterling [facility])     Prostate    Elevated Lp(a) 11/11/2019    Mixed hyperlipidemia with apolipoprotein E4 variant 11/11/2019    Prehypertension 5/31/2018    Skin cancer     Squamous cell Carcinoma of upper lip s/p Moh's    Sun-damaged skin        Past Surgical History:   Procedure Laterality Date    HX APPENDECTOMY      HX CRYOABLATION OF THE PROSTATE      HX OTHER SURGICAL      Meckel's  diverticulum    HX VASECTOMY         Family History   Problem Relation Age of Onset    Cancer Mother         uterine    Heart Disease Father         52's    Pacemaker Father 76       Social History     Socioeconomic History    Marital status:      Spouse name: Not on file    Number of children: Not on file    Years of education: Not on file    Highest education level: Not on file   Occupational History    Occupation: [employer]   Tobacco Use    Smoking status: Current Some Day Smoker     Types: Cigars    Smokeless tobacco: Current User     Types: Snuff    Tobacco comment: once a month   Substance and Sexual Activity    Alcohol use: Yes     Alcohol/week: 4.2 - 5.0 standard drinks     Types: 5 - 6 Standard drinks or equivalent per week     Comment: 5-6 a week    Drug use: No    Sexual activity: Yes     Partners: Female   Other Topics Concern    Not on file   Social History Narrative    Not on file       No visits with results within 3 Month(s) from this visit.    Latest known visit with results is:   Office Visit on 06/26/2019   Component Date Value Ref Range Status    Glucose 06/26/2019 99  65 - 99 mg/dL Final    BUN 06/26/2019 12  6 - 24 mg/dL Final    Creatinine 06/26/2019 0.93  0.76 - 1.27 mg/dL Final    GFR est non-AA 06/26/2019 93  >59 mL/min/1.73 Final    GFR est AA 06/26/2019 108  >59 mL/min/1.73 Final    BUN/Creatinine ratio 06/26/2019 13  9 - 20 Final    Sodium 06/26/2019 142  134 - 144 mmol/L Final    Potassium 06/26/2019 4.6  3.5 - 5.2 mmol/L Final    Chloride 06/26/2019 108* 96 - 106 mmol/L Final    CO2 06/26/2019 21  20 - 29 mmol/L Final    Calcium 06/26/2019 8.9  8.7 - 10.2 mg/dL Final    Protein, total 06/26/2019 6.4  6.0 - 8.5 g/dL Final    Albumin 06/26/2019 4.1  3.5 - 5.5 g/dL Final    GLOBULIN, TOTAL 06/26/2019 2.3  1.5 - 4.5 g/dL Final    A-G Ratio 06/26/2019 1.8  1.2 - 2.2 Final    Bilirubin, total 06/26/2019 0.3  0.0 - 1.2 mg/dL Final    Alk. phosphatase 06/26/2019 62  39 - 117 IU/L Final    AST (SGOT) 06/26/2019 20  0 - 40 IU/L Final    ALT (SGPT) 06/26/2019 28  0 - 44 IU/L Final    Cholesterol, total 06/26/2019 181  100 - 199 mg/dL Final    Triglyceride 06/26/2019 102  0 - 149 mg/dL Final    HDL Cholesterol 06/26/2019 69  >39 mg/dL Final    VLDL, calculated 06/26/2019 20  5 - 40 mg/dL Final    LDL, calculated 06/26/2019 92  0 - 99 mg/dL Final    WBC 06/26/2019 5.4  3.4 - 10.8 x10E3/uL Final    RBC 06/26/2019 4.82  4.14 - 5.80 x10E6/uL Final    HGB 06/26/2019 14.2  13.0 - 17.7 g/dL Final    HCT 06/26/2019 44.5  37.5 - 51.0 % Final    MCV 06/26/2019 92  79 - 97 fL Final    MCH 06/26/2019 29.5  26.6 - 33.0 pg Final    MCHC 06/26/2019 31.9  31.5 - 35.7 g/dL Final    RDW 06/26/2019 14.0  12.3 - 15.4 % Final    PLATELET 72/97/1508 746  150 - 450 x10E3/uL Final    TSH 06/26/2019 2.660  0.450 - 4.500 uIU/mL Final    VITAMIN D, 25-HYDROXY 06/26/2019 22.7* 30.0 - 100.0 ng/mL Final    Comment: Vitamin D deficiency has been defined by the [place] of  [employer] and an Endocrine Society practice guideline as a  level of serum 25-OH vitamin D less than 20 ng/mL (1,2). The Endocrine Society went on to further define vitamin D  insufficiency as a level between 21 and 29 ng/mL (2). 1. IOM ([place] [employer]). 2010. Dietary reference     intakes for calcium and D. [facility]: The     [employer].   2. Fredis MF, Ashley RAMIREZ, Inderjit SCHWAB, et al.     Evaluation, treatment, and prevention of vitamin D     deficiency: an Endocrine Society clinical practice     guideline. JCEM. 2011 Jul; 96(7):1911-30. Review of Systems   Constitutional: Negative for activity change, fatigue and unexpected weight change. HENT: Negative for congestion, ear discharge, ear pain, hearing loss, rhinorrhea and sore throat. Eyes: Negative for pain, discharge and redness. Respiratory: Negative for cough, chest tightness and shortness of breath. Cardiovascular: Negative for leg swelling. Gastrointestinal: Negative for abdominal pain, constipation and diarrhea. Endocrine: Negative for polyuria. Genitourinary: Negative for dysuria, flank pain and urgency. Musculoskeletal: Negative for arthralgias, back pain and myalgias. Skin: Negative for color change. Neurological: Negative for dizziness, light-headedness and headaches. Psychiatric/Behavioral: Negative for dysphoric mood and sleep disturbance. The patient is not nervous/anxious. Visit Vitals  /81 (BP 1 Location: Left upper arm, BP Patient Position: Sitting)   Pulse 64   Temp 98.4 °F (36.9 °C) (Temporal)   Resp 18   Ht 6' (1.829 m)   Wt 218 lb (98.9 kg)   SpO2 97%   BMI 29.57 kg/m²      Physical Exam  Vitals and nursing note reviewed. Constitutional:       General: He is not in acute distress. Appearance: Normal appearance. He is well-developed. He is not diaphoretic. HENT:      Head: Normocephalic and atraumatic. Right Ear: Tympanic membrane, ear canal and external ear normal.      Left Ear: Tympanic membrane, ear canal and external ear normal.      Mouth/Throat:      Mouth: Mucous membranes are moist.      Pharynx: Oropharynx is clear. Eyes:      General: No scleral icterus. Right eye: No discharge. Left eye: No discharge. Extraocular Movements: Extraocular movements intact.       Conjunctiva/sclera: Conjunctivae normal.   Neck:      Thyroid: No thyromegaly. Cardiovascular:      Rate and Rhythm: Normal rate and regular rhythm. Pulses: Normal pulses. Dorsalis pedis pulses are 2+ on the right side and 2+ on the left side. Pulmonary:      Effort: Pulmonary effort is normal.      Breath sounds: Normal breath sounds. No wheezing. Abdominal:      General: Bowel sounds are normal. There is no distension. Palpations: Abdomen is soft. Tenderness: There is no abdominal tenderness. Musculoskeletal:      Cervical back: Normal range of motion and neck supple. Right lower leg: No edema. Left lower leg: No edema. Comments: B/L knees without crepitus   Lymphadenopathy:      Cervical: No cervical adenopathy. Neurological:      Deep Tendon Reflexes:      Reflex Scores:       Patellar reflexes are 2+ on the right side and 2+ on the left side. An electronic signature was used to authenticate this note.   -- Patricia Melendrez

## 2022-03-21 NOTE — PROGRESS NOTES
Chief Complaint   Patient presents with    Medication Refill     labs   1. \"Have you been to the ER, urgent care clinic since your last visit? Hospitalized since your last visit? \" No    2. \"Have you seen or consulted any other health care providers outside of the [address] [place] [address] since your last visit? \" No     3. For patients aged 39-70: Has the patient had a colonoscopy / FIT/ Cologuard? Yes - no Care Gap present      If the patient is female:    4. For patients aged 41-77: Has the patient had a mammogram within the past 2 years? NA - based on age or sex      11. For patients aged 21-65: Has the patient had a pap smear? NA - based on age or sex       Patient provided with most updated VIS prior to administration. Opportunity given for questions and concerns provided. No concerns at this time    Immunizations administered to patient 3/21/2022 by Fernando Pleitez LPN with consent. Patient tolerated procedure well. No reactions noted.

## 2022-03-24 PROBLEM — R79.89 LOW VITAMIN D LEVEL: Status: RESOLVED | Noted: 2020-03-17 | Resolved: 2022-03-21

## 2022-08-01 DIAGNOSIS — U07.1 BRONCHITIS DUE TO COVID-19 VIRUS: ICD-10-CM

## 2022-08-01 DIAGNOSIS — J40 BRONCHITIS DUE TO COVID-19 VIRUS: ICD-10-CM

## 2022-08-01 DIAGNOSIS — J40 BRONCHITIS DUE TO COVID-19 VIRUS: Primary | ICD-10-CM

## 2022-08-01 DIAGNOSIS — U07.1 BRONCHITIS DUE TO COVID-19 VIRUS: Primary | ICD-10-CM

## 2022-08-01 RX ORDER — METHYLPREDNISOLONE 4 MG/1
4 TABLET ORAL
Qty: 1 DOSE PACK | Refills: 0 | Status: SHIPPED | OUTPATIENT
Start: 2022-08-01 | End: 2022-08-07

## 2022-08-01 RX ORDER — METHYLPREDNISOLONE 4 MG/1
4 TABLET ORAL
Qty: 1 DOSE PACK | Refills: 0 | Status: SHIPPED | OUTPATIENT
Start: 2022-08-01 | End: 2022-08-01 | Stop reason: SDUPTHER

## 2023-10-18 DIAGNOSIS — Z11.4 ENCOUNTER FOR SCREENING FOR HIV: Primary | ICD-10-CM

## 2023-10-18 DIAGNOSIS — Z11.59 NEED FOR HEPATITIS C SCREENING TEST: ICD-10-CM

## 2023-10-18 DIAGNOSIS — Z00.00 LABORATORY EXAM ORDERED AS PART OF ROUTINE GENERAL MEDICAL EXAMINATION: ICD-10-CM

## 2023-11-06 ENCOUNTER — OFFICE VISIT (OUTPATIENT)
Dept: PRIMARY CARE CLINIC | Facility: CLINIC | Age: 58
End: 2023-11-06
Payer: COMMERCIAL

## 2023-11-06 VITALS
TEMPERATURE: 97.1 F | HEIGHT: 72 IN | DIASTOLIC BLOOD PRESSURE: 80 MMHG | BODY MASS INDEX: 28.85 KG/M2 | RESPIRATION RATE: 18 BRPM | OXYGEN SATURATION: 96 % | HEART RATE: 65 BPM | SYSTOLIC BLOOD PRESSURE: 135 MMHG | WEIGHT: 213 LBS

## 2023-11-06 DIAGNOSIS — Z00.00 LABORATORY EXAM ORDERED AS PART OF ROUTINE GENERAL MEDICAL EXAMINATION: ICD-10-CM

## 2023-11-06 DIAGNOSIS — Z23 NEEDS FLU SHOT: ICD-10-CM

## 2023-11-06 DIAGNOSIS — F41.0 ANXIETY ATTACK: ICD-10-CM

## 2023-11-06 DIAGNOSIS — R19.5 LOOSE BOWEL MOVEMENTS: ICD-10-CM

## 2023-11-06 DIAGNOSIS — Z11.4 ENCOUNTER FOR SCREENING FOR HIV: ICD-10-CM

## 2023-11-06 DIAGNOSIS — Z11.59 NEED FOR HEPATITIS C SCREENING TEST: ICD-10-CM

## 2023-11-06 DIAGNOSIS — Z00.00 PHYSICAL EXAM: Primary | ICD-10-CM

## 2023-11-06 DIAGNOSIS — E78.2 MIXED HYPERLIPIDEMIA: ICD-10-CM

## 2023-11-06 LAB
ALBUMIN SERPL-MCNC: 3.9 G/DL (ref 3.5–5)
ALBUMIN/GLOB SERPL: 1.1 (ref 1.1–2.2)
ALP SERPL-CCNC: 72 U/L (ref 45–117)
ALT SERPL-CCNC: 38 U/L (ref 12–78)
ANION GAP SERPL CALC-SCNC: 9 MMOL/L (ref 5–15)
AST SERPL-CCNC: 23 U/L (ref 15–37)
BILIRUB SERPL-MCNC: 0.8 MG/DL (ref 0.2–1)
BUN SERPL-MCNC: 16 MG/DL (ref 6–20)
BUN/CREAT SERPL: 17 (ref 12–20)
CALCIUM SERPL-MCNC: 9 MG/DL (ref 8.5–10.1)
CHLORIDE SERPL-SCNC: 105 MMOL/L (ref 97–108)
CHOLEST SERPL-MCNC: 145 MG/DL
CO2 SERPL-SCNC: 23 MMOL/L (ref 21–32)
COMMENT:: NORMAL
CREAT SERPL-MCNC: 0.95 MG/DL (ref 0.7–1.3)
ERYTHROCYTE [DISTWIDTH] IN BLOOD BY AUTOMATED COUNT: 12.3 % (ref 11.5–14.5)
GLOBULIN SER CALC-MCNC: 3.4 G/DL (ref 2–4)
GLUCOSE SERPL-MCNC: 107 MG/DL (ref 65–100)
HCT VFR BLD AUTO: 45.6 % (ref 36.6–50.3)
HDLC SERPL-MCNC: 90 MG/DL
HDLC SERPL: 1.6 (ref 0–5)
HGB BLD-MCNC: 15.2 G/DL (ref 12.1–17)
HIV 1+2 AB+HIV1 P24 AG SERPL QL IA: NONREACTIVE
HIV 1/2 RESULT COMMENT: NORMAL
LDLC SERPL CALC-MCNC: 41 MG/DL (ref 0–100)
MCH RBC QN AUTO: 30.7 PG (ref 26–34)
MCHC RBC AUTO-ENTMCNC: 33.3 G/DL (ref 30–36.5)
MCV RBC AUTO: 92.1 FL (ref 80–99)
NRBC # BLD: 0 K/UL (ref 0–0.01)
NRBC BLD-RTO: 0 PER 100 WBC
PLATELET # BLD AUTO: 212 K/UL (ref 150–400)
PMV BLD AUTO: 11.5 FL (ref 8.9–12.9)
POTASSIUM SERPL-SCNC: 4.3 MMOL/L (ref 3.5–5.1)
PROT SERPL-MCNC: 7.3 G/DL (ref 6.4–8.2)
PSA SERPL-MCNC: 0 NG/ML (ref 0.01–4)
RBC # BLD AUTO: 4.95 M/UL (ref 4.1–5.7)
SODIUM SERPL-SCNC: 137 MMOL/L (ref 136–145)
SPECIMEN HOLD: NORMAL
TRIGL SERPL-MCNC: 70 MG/DL
TSH SERPL DL<=0.05 MIU/L-ACNC: 3.57 UIU/ML (ref 0.36–3.74)
VLDLC SERPL CALC-MCNC: 14 MG/DL
WBC # BLD AUTO: 7 K/UL (ref 4.1–11.1)

## 2023-11-06 PROCEDURE — 90471 IMMUNIZATION ADMIN: CPT | Performed by: INTERNAL MEDICINE

## 2023-11-06 PROCEDURE — 99396 PREV VISIT EST AGE 40-64: CPT | Performed by: INTERNAL MEDICINE

## 2023-11-06 PROCEDURE — 90674 CCIIV4 VAC NO PRSV 0.5 ML IM: CPT | Performed by: INTERNAL MEDICINE

## 2023-11-06 RX ORDER — ALPRAZOLAM 0.5 MG/1
0.5 TABLET ORAL NIGHTLY PRN
Qty: 15 TABLET | Refills: 0 | Status: SHIPPED | OUTPATIENT
Start: 2023-11-06 | End: 2024-01-24

## 2023-11-06 SDOH — ECONOMIC STABILITY: HOUSING INSECURITY
IN THE LAST 12 MONTHS, WAS THERE A TIME WHEN YOU DID NOT HAVE A STEADY PLACE TO SLEEP OR SLEPT IN A SHELTER (INCLUDING NOW)?: NO

## 2023-11-06 SDOH — ECONOMIC STABILITY: INCOME INSECURITY: HOW HARD IS IT FOR YOU TO PAY FOR THE VERY BASICS LIKE FOOD, HOUSING, MEDICAL CARE, AND HEATING?: NOT HARD AT ALL

## 2023-11-06 SDOH — ECONOMIC STABILITY: FOOD INSECURITY: WITHIN THE PAST 12 MONTHS, THE FOOD YOU BOUGHT JUST DIDN'T LAST AND YOU DIDN'T HAVE MONEY TO GET MORE.: NEVER TRUE

## 2023-11-06 SDOH — ECONOMIC STABILITY: FOOD INSECURITY: WITHIN THE PAST 12 MONTHS, YOU WORRIED THAT YOUR FOOD WOULD RUN OUT BEFORE YOU GOT MONEY TO BUY MORE.: NEVER TRUE

## 2023-11-06 ASSESSMENT — ENCOUNTER SYMPTOMS
CHEST TIGHTNESS: 0
RHINORRHEA: 0
SORE THROAT: 0
EYE DISCHARGE: 0
CONSTIPATION: 0
ABDOMINAL PAIN: 0
COLOR CHANGE: 0
COUGH: 0
DIARRHEA: 0
SHORTNESS OF BREATH: 0
BACK PAIN: 0

## 2023-11-06 ASSESSMENT — PATIENT HEALTH QUESTIONNAIRE - PHQ9
SUM OF ALL RESPONSES TO PHQ QUESTIONS 1-9: 0
1. LITTLE INTEREST OR PLEASURE IN DOING THINGS: 0
2. FEELING DOWN, DEPRESSED OR HOPELESS: 0
SUM OF ALL RESPONSES TO PHQ QUESTIONS 1-9: 0
SUM OF ALL RESPONSES TO PHQ9 QUESTIONS 1 & 2: 0

## 2023-11-06 NOTE — PROGRESS NOTES
Snehal Lai (:  1965) is a 63 y.o. male, Established patient, here for evaluation of the following chief complaint(s): Annual Exam           ASSESSMENT/PLAN:  1. Physical exam  Complete physical done today. Routine lab work completed this afternoon. Waiting for results. 2. Mixed hyperlipidemia  I recommend that he continue taking rosuvastatin 10mg nightly. 3. Loose bowel movements  I recommend that he continue taking probiotics. 4. Needs flu shot  -     Influenza, FLUCELVAX, (age 10 mo+), IM, Preservative Free, 0.5 mL  Influenza vaccine administered in office. 5. Anxiety attack  -     ALPRAZolam (XANAX) 0.5 MG tablet; Take 1 tablet by mouth nightly as needed for Sleep or Anxiety for up to 15 doses. Max Daily Amount: 0.5 mg, Disp-15 tablet, R-0Normal sent to pharmacy. Reviewed . I refilled Xanax 0.5mg to continue taking PRN. Subjective   SUBJECTIVE/OBJECTIVE:  HPI    Patient presents today for an office visit and a physical. He completed his blood work this afternoon. He is experiencing loose bowel movements that he resolved with probiotics. His colonoscopy was done in 2017 and was informed to return after 10 years. He admits to intermittent heartburns and gaseousness; however, he states that his symptoms are related to his diet and he can identify his triggers. His BP is elevated today in office at 144/74, 135/80 on manual repeat in left arm while sitting down. He is followed by Dr. Ciro Kramer (Cardiology) biannually and passed his most recent stress test. He states  that he has a BP monitor at home but does not use it. He is taking rosuvastatin 10mg nightly. He walks regularly. He is followed by urology annually because of his past history of prostate cancer. He has a history of radioactive seed implants. He is taking Xanax 0.5mg PRN. He is no longer using drysol 20% daily. He is taking Vitamin D supplements.     Patient Active Problem List   Diagnosis    Agatston CAC

## 2023-11-07 DIAGNOSIS — R73.02 IGT (IMPAIRED GLUCOSE TOLERANCE): Primary | ICD-10-CM

## 2023-11-07 LAB
HCV AB SER IA-ACNC: 0.02 INDEX
HCV AB SERPL QL IA: NONREACTIVE

## 2023-11-08 LAB
EST. AVERAGE GLUCOSE BLD GHB EST-MCNC: 105 MG/DL
HBA1C MFR BLD: 5.3 % (ref 4–5.6)

## 2024-09-06 ENCOUNTER — OFFICE VISIT (OUTPATIENT)
Dept: PRIMARY CARE CLINIC | Facility: CLINIC | Age: 59
End: 2024-09-06

## 2024-09-06 VITALS
RESPIRATION RATE: 18 BRPM | SYSTOLIC BLOOD PRESSURE: 143 MMHG | HEART RATE: 98 BPM | DIASTOLIC BLOOD PRESSURE: 73 MMHG | HEIGHT: 72 IN | WEIGHT: 217 LBS | BODY MASS INDEX: 29.39 KG/M2 | OXYGEN SATURATION: 95 % | TEMPERATURE: 97 F

## 2024-09-06 DIAGNOSIS — E78.2 COMBINED HYPERLIPIDEMIA: ICD-10-CM

## 2024-09-06 DIAGNOSIS — I10 PRIMARY HYPERTENSION: ICD-10-CM

## 2024-09-06 DIAGNOSIS — M79.604 LOW BACK PAIN RADIATING TO RIGHT LOWER EXTREMITY: Primary | ICD-10-CM

## 2024-09-06 DIAGNOSIS — M54.50 LOW BACK PAIN RADIATING TO RIGHT LOWER EXTREMITY: Primary | ICD-10-CM

## 2024-09-06 DIAGNOSIS — Z23 ENCOUNTER FOR VACCINATION: ICD-10-CM

## 2024-09-06 RX ORDER — CYCLOBENZAPRINE HCL 10 MG
10 TABLET ORAL NIGHTLY PRN
Qty: 10 TABLET | Refills: 0 | Status: SHIPPED | OUTPATIENT
Start: 2024-09-06 | End: 2024-09-16

## 2024-09-06 RX ORDER — AMLODIPINE BESYLATE 5 MG/1
5 TABLET ORAL DAILY
Qty: 30 TABLET | Refills: 1 | Status: SHIPPED | OUTPATIENT
Start: 2024-09-06

## 2024-09-06 RX ORDER — MELOXICAM 15 MG/1
15 TABLET ORAL DAILY
Qty: 15 TABLET | Refills: 0 | Status: SHIPPED | OUTPATIENT
Start: 2024-09-06 | End: 2024-09-21

## 2024-09-06 ASSESSMENT — PATIENT HEALTH QUESTIONNAIRE - PHQ9
SUM OF ALL RESPONSES TO PHQ QUESTIONS 1-9: 0
2. FEELING DOWN, DEPRESSED OR HOPELESS: NOT AT ALL
SUM OF ALL RESPONSES TO PHQ9 QUESTIONS 1 & 2: 0
SUM OF ALL RESPONSES TO PHQ QUESTIONS 1-9: 0
SUM OF ALL RESPONSES TO PHQ QUESTIONS 1-9: 0
1. LITTLE INTEREST OR PLEASURE IN DOING THINGS: NOT AT ALL
SUM OF ALL RESPONSES TO PHQ QUESTIONS 1-9: 0

## 2024-09-06 NOTE — PROGRESS NOTES
\"Have you been to the ER, urgent care clinic since your last visit?  Hospitalized since your last visit?\"    NO      “Have you seen or consulted any other health care providers outside our system since your last visit?”    Urgent Care      Chief Complaint   Patient presents with    Lower Back Pain     In August 2024 started. Went to an urgent care and got prescribed prednisone. Pain started at base of spine now going into butt then leg on right side.        Flu shot given today in office.

## 2024-09-06 NOTE — PROGRESS NOTES
Moncho Boyer (:  1965) is a 59 y.o. male, {New vs Established:[id number]::\"Established patient\"}, here for evaluation of the following chief complaint(s):  No chief complaint on file.        Assessment & Plan   ASSESSMENT/PLAN:  {There are no diagnoses linked to this encounter. (Refresh or delete this SmartLink)}    USPSTF recommendations discussed with patient and patient agreed to screening test.            Subjective   SUBJECTIVE/OBJECTIVE:  HPI                  Patient Active Problem List   Diagnosis    Agatston CAC score, <100    History of prostate cancer    Sinus bradycardia    Elevated Lp(a)    Mixed hyperlipidemia with apolipoprotein E4 variant    RBBB        Current Outpatient Medications on File Prior to Visit   Medication Sig Dispense Refill    TURMERIC PO Take by mouth      Probiotic Product (PROBIOTIC PO) Take by mouth      aspirin 81 MG chewable tablet Take 1 tablet by mouth daily      Cholecalciferol 50 MCG (2000) CAPS Take 1 capsule by mouth daily      rosuvastatin (CRESTOR) 10 MG tablet Take 1 tablet by mouth nightly      sildenafil (REVATIO) 20 MG tablet TAKE ONE TO FIVE TABLETS BY MOUTH AS NEEDED ONE HOUR PRIOR TO SEXUAL ACTIVITY       No current facility-administered medications on file prior to visit.       No Known Allergies    Past Medical History:   Diagnosis Date    Agatston CAC score, <100 2019    Anxiety     takes xanax prn prescribed by Dr. Franco    Cancer ([facility])     Prostate    Elevated Lp(a) 2019    Mixed hyperlipidemia with apolipoprotein E4 variant 2019    Skin cancer     Squamous cell Carcinoma of upper lip s/p Moh's    Sun-damaged skin        Past Surgical History:   Procedure Laterality Date    APPENDECTOMY      OTHER SURGICAL HISTORY      Meckel's  diverticulum    PROSTATE CRYOABLATION      VASECTOMY         Family History   Problem Relation Age of Onset    Pacemaker Father 75    Heart Disease Father         50's    High Blood Pressure Father

## 2024-09-06 NOTE — PROGRESS NOTES
Moncho Boyer (:  1965) is a 59 y.o. male,Established patient, here for evaluation of the following chief complaint(s):  Lower Back Pain (In 2024 started. Went to an urgent care and got prescribed prednisone. Pain started at base of spine now going into butt then leg on right side. )    History of Present Illness  The patient is a 59-year-old male who presents for evaluation of lower back pain.    He began experiencing back pain in early 2024, which has progressively worsened. Despite a history of self-resolving back pain, this episode did not improve after two weeks. He sought care at [facility] in [place], where he was prescribed prednisone. The pain, which he describes as radiating down his right buttock and leg, is a new symptom for him. He recalls mowing the lawn the day before the onset of pain but reports no heavy lifting or strenuous activity. His sleep has been satisfactory.    He has been monitoring his blood pressure at home, with readings of 134/83 in 2024 and 142/88 in 2024. His pulse rate was 74 yesterday and 79 in 2024. He admits to recent financial stress. He last saw his cardiologist in 2024 and has an upcoming appointment in 2024. He reports no falls or injuries. His blood pressure reading at the cardiologist's office on 2024, was 161/93.    He has been taking Crestor for his cholesterol. He also has a higher glucose level.     Physical Exam  BP (!) 143/73 (Site: Left Upper Arm, Position: Sitting)   Pulse 98   Temp 97 °F (36.1 °C) (Temporal)   Resp 18   Ht 1.829 m (6')   Wt 98.4 kg (217 lb)   SpO2 95%   BMI 29.43 kg/m²    Repeat Blood pressure reading is 148/82.     Vitals and nursing note reviewed.   Constitutional:       Appearance: Normal appearance.   Eyes:      Extraocular Movements: Extraocular movements intact.      Pupils: Pupils are equal, round, and reactive to light.   Cardiovascular:      Rate and

## 2024-09-09 ENCOUNTER — HOSPITAL ENCOUNTER (OUTPATIENT)
Facility: HOSPITAL | Age: 59
Discharge: HOME | End: 2024-09-12
Payer: COMMERCIAL

## 2024-09-09 DIAGNOSIS — M54.50 LOW BACK PAIN RADIATING TO RIGHT LOWER EXTREMITY: ICD-10-CM

## 2024-09-09 DIAGNOSIS — M79.604 LOW BACK PAIN RADIATING TO RIGHT LOWER EXTREMITY: ICD-10-CM

## 2024-09-09 PROCEDURE — 72100 X-RAY EXAM L-S SPINE 2/3 VWS: CPT

## 2024-11-13 DIAGNOSIS — I10 PRIMARY HYPERTENSION: ICD-10-CM

## 2024-11-13 RX ORDER — AMLODIPINE BESYLATE 5 MG/1
5 TABLET ORAL DAILY
Qty: 30 TABLET | Refills: 1 | Status: SHIPPED | OUTPATIENT
Start: 2024-11-13

## 2024-12-30 DIAGNOSIS — I10 PRIMARY HYPERTENSION: ICD-10-CM

## 2024-12-30 RX ORDER — AMLODIPINE BESYLATE 5 MG/1
5 TABLET ORAL DAILY
Qty: 90 TABLET | Refills: 1 | Status: SHIPPED | OUTPATIENT
Start: 2024-12-30

## 2025-03-13 ENCOUNTER — OFFICE VISIT (OUTPATIENT)
Age: 60
End: 2025-03-13

## 2025-03-13 VITALS
WEIGHT: 213 LBS | TEMPERATURE: 98.3 F | RESPIRATION RATE: 18 BRPM | DIASTOLIC BLOOD PRESSURE: 92 MMHG | SYSTOLIC BLOOD PRESSURE: 142 MMHG | BODY MASS INDEX: 28.89 KG/M2 | OXYGEN SATURATION: 96 % | HEART RATE: 65 BPM

## 2025-03-13 DIAGNOSIS — R05.1 ACUTE COUGH: ICD-10-CM

## 2025-03-13 DIAGNOSIS — J06.9 UPPER RESPIRATORY TRACT INFECTION, UNSPECIFIED TYPE: Primary | ICD-10-CM

## 2025-03-13 RX ORDER — BENZONATATE 200 MG/1
200 CAPSULE ORAL 3 TIMES DAILY PRN
Qty: 21 CAPSULE | Refills: 0 | Status: SHIPPED | OUTPATIENT
Start: 2025-03-13 | End: 2025-03-20

## 2025-03-13 RX ORDER — VALSARTAN 80 MG/1
80 TABLET ORAL DAILY
COMMUNITY
Start: 2025-01-22

## 2025-03-13 RX ORDER — METHYLPREDNISOLONE 4 MG/1
TABLET ORAL
Qty: 1 KIT | Refills: 0 | Status: SHIPPED | OUTPATIENT
Start: 2025-03-13 | End: 2025-03-19

## 2025-03-13 NOTE — PROGRESS NOTES
3/13/2025   Moncho Boyer (: 1965) is a 59 y.o. male, New patient, here for evaluation of the following chief complaint(s):  Cold Symptoms (Pt presents with runny nose, bilateral ear fullness, and head congestion x 5 days)     ASSESSMENT/PLAN:  Below is the assessment and plan developed based on review of pertinent history, physical exam, labs, studies, and medications.  1. Upper respiratory tract infection, unspecified type  -     methylPREDNISolone (MEDROL DOSEPACK) 4 MG tablet; Take by mouth., Disp-1 kit, R-0Normal  2. Acute cough  -     benzonatate (TESSALON) 200 MG capsule; Take 1 capsule by mouth 3 times daily as needed for Cough, Disp-21 capsule, R-0Normal  -     methylPREDNISolone (MEDROL DOSEPACK) 4 MG tablet; Take by mouth., Disp-1 kit, R-0Normal    History and physical today are consistent with a viral upper respiratory illness  Vital signs are stable, physical exam is benign, no evidence of bacterial infection at this time  Treat symptomatically  Tylenol/ibuprofen for fevers, chills, aches and pains  Mucinex OTC to help thin secretions  Medrol dose pack to help relieve sinus congestion and pressure  Benzonatate up to 3x daily for cough  Hot tea with honey, saline sinus rinses, throat lozenges  Lots of fluid, plenty of rest  Follow up here or with PCP if symptoms persist or worsen  Go to ED if you develop any shortness of breath, chest pain or if you are unable to tolerate food or fluids  Handout given with care instructions  2. OTC for symptom management. Increase fluid intake, ensure adequate nutritional intake.  3. Follow up with PCP as needed.  4. Go to ED with development of any acute symptoms.     Follow up:    Follow up immediately for any new, worsening or changes or if symptoms are not improving over the next 5-7 days.     SUBJECTIVE/OBJECTIVE:  59-year-old patient here today with complaint of ongoing cough, sinus congestion, runny nose, and ear fullness for 5 days.  Denies any fever.  No

## 2025-03-13 NOTE — PATIENT INSTRUCTIONS
Thank you for visiting [facility] today.    Flonase (over the counter) nasal spray, once a day  OTC plain Mucinex to help thin secretions  Medrol dose pack as prescribed to help relieve sinus symptoms  Ibuprofen (400-800 mg) every 8 hours; Tylenol (325-500) mg every 6 hours   Zyrtec/Xyzal/Allegra/Claritin during the day or Benadryl at night may help with allergies.  You may use the decongestant version of these medications as well.  Simple foods like chicken noodle soup, smoothies, hot tea with lemon and honey may also help  Steam inhalation, humidifier, warm compresses  Increase oral fluids to maintain hydration  Avoid smoking and minimize contact with environmental irritants    Please follow up with your primary care provider if your symptoms last more than 10 days or worsen.    Please go immediately to the Emergency Department if you develop:  Fever higher than 102F (38.9C), sudden and severe pain in the face and head, trouble seeing or seeing double, trouble thinking clearly, swelling or redness around one or both eyes or a stiff neck